# Patient Record
Sex: MALE | Race: WHITE | NOT HISPANIC OR LATINO
[De-identification: names, ages, dates, MRNs, and addresses within clinical notes are randomized per-mention and may not be internally consistent; named-entity substitution may affect disease eponyms.]

---

## 2017-06-08 ENCOUNTER — APPOINTMENT (OUTPATIENT)
Dept: CARDIOLOGY | Facility: CLINIC | Age: 72
End: 2017-06-08

## 2017-11-15 ENCOUNTER — APPOINTMENT (OUTPATIENT)
Dept: CARDIOLOGY | Facility: CLINIC | Age: 72
End: 2017-11-15

## 2017-12-14 ENCOUNTER — APPOINTMENT (OUTPATIENT)
Dept: CARDIOLOGY | Facility: CLINIC | Age: 72
End: 2017-12-14

## 2018-01-03 ENCOUNTER — RECORD ABSTRACTING (OUTPATIENT)
Age: 73
End: 2018-01-03

## 2018-01-03 RX ORDER — TAMSULOSIN HYDROCHLORIDE 0.4 MG/1
0.4 CAPSULE ORAL DAILY
Refills: 0 | Status: ACTIVE | COMMUNITY

## 2018-01-08 ENCOUNTER — RX RENEWAL (OUTPATIENT)
Age: 73
End: 2018-01-08

## 2018-10-29 ENCOUNTER — RX RENEWAL (OUTPATIENT)
Age: 73
End: 2018-10-29

## 2019-01-14 ENCOUNTER — RX RENEWAL (OUTPATIENT)
Age: 74
End: 2019-01-14

## 2019-01-15 ENCOUNTER — RX RENEWAL (OUTPATIENT)
Age: 74
End: 2019-01-15

## 2019-01-31 ENCOUNTER — APPOINTMENT (OUTPATIENT)
Dept: CARDIOLOGY | Facility: CLINIC | Age: 74
End: 2019-01-31
Payer: MEDICARE

## 2019-01-31 ENCOUNTER — NON-APPOINTMENT (OUTPATIENT)
Age: 74
End: 2019-01-31

## 2019-01-31 VITALS
OXYGEN SATURATION: 95 % | HEIGHT: 74 IN | DIASTOLIC BLOOD PRESSURE: 70 MMHG | WEIGHT: 265 LBS | HEART RATE: 65 BPM | BODY MASS INDEX: 34.01 KG/M2 | SYSTOLIC BLOOD PRESSURE: 132 MMHG

## 2019-01-31 DIAGNOSIS — M47.9 SPONDYLOSIS, UNSPECIFIED: ICD-10-CM

## 2019-01-31 DIAGNOSIS — Z87.891 PERSONAL HISTORY OF NICOTINE DEPENDENCE: ICD-10-CM

## 2019-01-31 PROCEDURE — 93000 ELECTROCARDIOGRAM COMPLETE: CPT

## 2019-01-31 RX ORDER — THIAMINE HCL 100 MG
500 TABLET ORAL DAILY
Refills: 0 | Status: DISCONTINUED | COMMUNITY
End: 2019-01-31

## 2019-01-31 RX ORDER — CHLORDIAZEPOXIDE HYDROCHLORIDE 25 MG/1
25 CAPSULE ORAL 4 TIMES DAILY
Refills: 0 | Status: ACTIVE | COMMUNITY

## 2019-01-31 RX ORDER — GABAPENTIN 300 MG/1
300 CAPSULE ORAL TWICE DAILY
Refills: 0 | Status: DISCONTINUED | COMMUNITY
End: 2019-01-31

## 2019-01-31 NOTE — REVIEW OF SYSTEMS
[Dyspnea on exertion] : dyspnea during exertion [Joint Pain] : joint pain [Negative] : Heme/Lymph [Chest  Pressure] : no chest pressure [Chest Pain] : no chest pain [Lower Ext Edema] : no extremity edema [Leg Claudication] : no intermittent leg claudication [Palpitations] : no palpitations [FreeTextEntry1] : Chronic back pain

## 2019-01-31 NOTE — REASON FOR VISIT
[FreeTextEntry1] : Matt is a 73-year-old male with a history of hypertension, asthma, chronic back pain, remote tobacco, and moderate obesity, moderate alcohol, AF on Xarelto. \par \par Patient has dyspnea with moderate exertion. Cardiovascular review of symptoms is negative for exertional chest pain,  palpitations, dizziness or syncope.  No PND or orthopnea leg edema.  No bleeding or black stool.\par \par Patient has chronic back pain, pain management with epidural injections with Dr. Mims.  Patient is unable to complete treadmill stress test and will be scheduled for pharmacologic stress test. \par \par Patient continues to drink 2 vodkas per day. Advised patient to stop all forms of alcohol. \par \par Patient had a prior admission to [facility] on July 25, 2014, for Syncope found to be in rapid atrial fibrillation. Patient states he had several drinks the night prior, the next morning after drinking coffee had a syncopal event. He was found to be in rapid atrial fibrillation in the emergency room, ventricular rate of 140 and with rate control converted to sinus rhythm. The NYA-VASC score 2,  on Xarelto  20mg daily. \par \par EKG January 2019, sinus rhythm first degree AV block PRWP, old anteroseptal MI\par \par Exercise Myoview stress test December 2016, LVEF 59%, normal perfusion nonischemic EKG response, symptoms, 89% MPHR, 5 minute Roberto protocol.\par \par 2-D echo November 2016, LVEF 65%, LA size is 4.6, mild diastolic dysfunction, mild MR, mild/moderate PI, PASP 33\par \par EKG Nov 2015, SB VR47 PRWP LAD\par \par EKG October 2014 SB PRWP LAD\par \par CardioNet event recorder completed for 4 weeks Sept-Oct 2014, NSR no AFib PVC's NSVT. \par \par 2DEcho [facility] July, 2014, reveals LVEF of 60% to 65%, mild diastolic dysfunction, mild MR, moderate TR, PA pressure of 43, moderate left atrial enlargement at 5.1, mild left ventricular hypertrophy, and normal LV chamber size.\par \par

## 2019-01-31 NOTE — PHYSICAL EXAM
[General Appearance - Well Developed] : well developed [Normal Appearance] : normal appearance [Well Groomed] : well groomed [General Appearance - Well Nourished] : well nourished [No Deformities] : no deformities [General Appearance - In No Acute Distress] : no acute distress [Normal Conjunctiva] : the conjunctiva exhibited no abnormalities [Eyelids - No Xanthelasma] : the eyelids demonstrated no xanthelasmas [Normal Oral Mucosa] : normal oral mucosa [No Oral Pallor] : no oral pallor [No Oral Cyanosis] : no oral cyanosis [Normal Jugular Venous A Waves Present] : normal jugular venous A waves present [Normal Jugular Venous V Waves Present] : normal jugular venous V waves present [No Jugular Venous Howard A Waves] : no jugular venous howard A waves [Respiration, Rhythm And Depth] : normal respiratory rhythm and effort [Exaggerated Use Of Accessory Muscles For Inspiration] : no accessory muscle use [Auscultation Breath Sounds / Voice Sounds] : lungs were clear to auscultation bilaterally [Heart Rate And Rhythm] : heart rate and rhythm were normal [Heart Sounds] : normal S1 and S2 [Murmurs] : no murmurs present [Abdomen Soft] : soft [Abdomen Tenderness] : non-tender [Abdomen Mass (___ Cm)] : no abdominal mass palpated [Abnormal Walk] : normal gait [Gait - Sufficient For Exercise Testing] : the gait was sufficient for exercise testing [Nail Clubbing] : no clubbing of the fingernails [Cyanosis, Localized] : no localized cyanosis [Petechial Hemorrhages (___cm)] : no petechial hemorrhages [Skin Color & Pigmentation] : normal skin color and pigmentation [] : no rash [No Venous Stasis] : no venous stasis [Skin Lesions] : no skin lesions [No Skin Ulcers] : no skin ulcer [No Xanthoma] : no  xanthoma was observed [Oriented To Time, Place, And Person] : oriented to person, place, and time [Affect] : the affect was normal [Mood] : the mood was normal [No Anxiety] : not feeling anxious [FreeTextEntry1] : Pleasant obese elderly male

## 2019-01-31 NOTE — DISCUSSION/SUMMARY
[FreeTextEntry1] : Matt is a 73-year-old male  with medical history detailed above and active medical issues including:\par \par -Dyspnea moderate exertion anginal equivalent, abnormal baseline EKG, multiple CAD risk factors. Patient will have noninvasive testing with [employer] Myoview stress echo to assess for obstructive CAD, HR and BP response, exercise-induced arrhythmia, 2DEcho for LVEF, structural heart disease. \par \par - PAF in the setting of dehydration alcohol and caffeine, CHADS-VASc score 2 on Xarelto\par \par - HTN at goal <130/90 on atenolol and lostartan\par \par - Asthma\par \par - Obesity. Discussed calorie reduction and increased exercise as a weight reduction strategy.\par \par - Brief NSVT in setting of normal LVEF\par \par - History of hemorrhoidal surgery stable\par \par Continued risk factor modification as been discussed, low sodium low fat diet, moderate physical activity.\par \par Patient will be seen in cardiology follow after noninvasive testing. Current cardiac medications remain unchanged. Repeat labs will be ordered with PMD. Recommended increased oral hydration with electrolyte suppliment drinks, avoid caffeine and alcohol intake.\par \par Matt will follow-up with Dr. Turner for primary care.\par \par  \par \par

## 2019-02-22 ENCOUNTER — RECORD ABSTRACTING (OUTPATIENT)
Age: 74
End: 2019-02-22

## 2019-02-26 ENCOUNTER — APPOINTMENT (OUTPATIENT)
Dept: CARDIOLOGY | Facility: CLINIC | Age: 74
End: 2019-02-26
Payer: MEDICARE

## 2019-02-26 PROCEDURE — A9502: CPT

## 2019-02-26 PROCEDURE — 93015 CV STRESS TEST SUPVJ I&R: CPT

## 2019-02-26 PROCEDURE — 78452 HT MUSCLE IMAGE SPECT MULT: CPT

## 2019-02-26 PROCEDURE — 93306 TTE W/DOPPLER COMPLETE: CPT

## 2019-02-26 RX ORDER — EMPAGLIFLOZIN 10 MG/1
10 TABLET, FILM COATED ORAL DAILY
Refills: 0 | Status: DISCONTINUED | COMMUNITY
End: 2019-02-26

## 2019-02-28 ENCOUNTER — APPOINTMENT (OUTPATIENT)
Age: 74
End: 2019-02-28
Payer: MEDICARE

## 2019-02-28 VITALS
SYSTOLIC BLOOD PRESSURE: 130 MMHG | HEART RATE: 56 BPM | DIASTOLIC BLOOD PRESSURE: 72 MMHG | OXYGEN SATURATION: 96 % | BODY MASS INDEX: 34.14 KG/M2 | HEIGHT: 74 IN | WEIGHT: 266 LBS

## 2019-02-28 DIAGNOSIS — Z87.898 PERSONAL HISTORY OF OTHER SPECIFIED CONDITIONS: ICD-10-CM

## 2019-02-28 DIAGNOSIS — Z86.79 PERSONAL HISTORY OF OTHER DISEASES OF THE CIRCULATORY SYSTEM: ICD-10-CM

## 2019-02-28 DIAGNOSIS — Z86.39 PERSONAL HISTORY OF OTHER ENDOCRINE, NUTRITIONAL AND METABOLIC DISEASE: ICD-10-CM

## 2019-02-28 DIAGNOSIS — Z80.42 FAMILY HISTORY OF MALIGNANT NEOPLASM OF PROSTATE: ICD-10-CM

## 2019-02-28 PROCEDURE — 99214 OFFICE O/P EST MOD 30 MIN: CPT

## 2019-02-28 NOTE — REASON FOR VISIT
[Follow-Up - Clinic] : a clinic follow-up of [FreeTextEntry2] : Noninvasive testing for dyspnea on exertion, multiple CAD risk factors, HTN, dyslipidemia, obesity, type 2 diabetes [FreeTextEntry1] : Matt is a 73-year-old male with a history of hypertension, asthma, chronic back pain, remote tobacco, and moderate obesity, moderate alcohol, AF on Xarelto. \par \par Patient has dyspnea with moderate exertion. Cardiovascular review of symptoms is negative for exertional chest pain,  palpitations, dizziness or syncope.  No PND or orthopnea leg edema.  No bleeding or black stool.\par \par Patient has chronic back pain, pain management with epidural injections with Dr. Mims.  Patient is unable to complete treadmill stress test and will be scheduled for pharmacologic stress test. \par \par Patient continues to drink 2 vodkas per day. Advised patient to stop all forms of alcohol. \par \par Patient had a prior admission to [facility] on July 25, 2014, for Syncope found to be in rapid atrial fibrillation. Patient states he had several drinks the night prior, the next morning after drinking coffee had a syncopal event. He was found to be in rapid atrial fibrillation in the emergency room, ventricular rate of 140 and with rate control converted to sinus rhythm. The NYA-VASC score 2,  on Xarelto  20mg daily. \par \par Lexascan Myoview stress test February 2019, LVEF 60%, small fixed apical defect with normal wall motion likely attenuation artifact, no ischemia, no chest pain, baseline sinus bradycardia PRWP, old ASWMI, NSST\par \par Echocardiogram February 2018, LVEF 60%, mild diastolic dysfunction, mild MR and TR, normal RVSP\par \par EKG January 2019, sinus rhythm first degree AV block PRWP, old anteroseptal MI\par \par Exercise Myoview stress test December 2016, LVEF 59%, normal perfusion nonischemic EKG response, symptoms, 89% MPHR, 5 minute Roberto protocol.\par \par 2-D echo November 2016, LVEF 65%, LA size is 4.6, mild diastolic dysfunction, mild MR, mild/moderate PI, PASP 33\par \par EKG Nov 2015, SB VR47 PRWP LAD\par \par EKG October 2014 SB PRWP LAD\par \par CardioNet event recorder completed for 4 weeks Sept-Oct 2014, NSR no AFib PVC's NSVT. \par \par 2DEcho [facility] July, 2014, reveals LVEF of 60% to 65%, mild diastolic dysfunction, mild MR, moderate TR, PA pressure of 43, moderate left atrial enlargement at 5.1, mild left ventricular hypertrophy, and normal LV chamber size.\par \par

## 2019-02-28 NOTE — DISCUSSION/SUMMARY
[FreeTextEntry1] : Matt is a 73-year-old male  with medical history detailed above and active medical issues including:\par \par -Dyspnea moderate exertion , normal perfusion Myoview stress test with normal LVEF Feb 2019.\par \par - PAF in the setting of dehydration alcohol and caffeine, CHADS-VASc score 2 on Xarelto\par \par - HTN at goal <130/90 on atenolol and lostartan\par \par - Asthma\par \par - Obesity. Discussed calorie reduction and increased exercise as a weight reduction strategy.\par \par - Brief NSVT in setting of normal LVEF\par \par - History of hemorrhoidal surgery stable\par \par Continued risk factor modification as been discussed, low sodium low fat diet, moderate physical activity.\par \par Patient will be seen in cardiology follow 6 months. Carotid and abdominal ultrasound to assess for obstructive PAD. \par  \par Current cardiac medications remain unchanged. Repeat labs will be ordered with PMD. Recommended increased oral hydration with electrolyte suppliment drinks, avoid caffeine and alcohol intake.\par \par Matt will follow-up with Dr. Turner for primary care.\par \par  \par \par

## 2019-05-15 ENCOUNTER — RX RENEWAL (OUTPATIENT)
Age: 74
End: 2019-05-15

## 2019-07-29 ENCOUNTER — RX RENEWAL (OUTPATIENT)
Age: 74
End: 2019-07-29

## 2019-07-29 ENCOUNTER — MEDICATION RENEWAL (OUTPATIENT)
Age: 74
End: 2019-07-29

## 2019-09-09 ENCOUNTER — APPOINTMENT (OUTPATIENT)
Dept: CARDIOLOGY | Facility: CLINIC | Age: 74
End: 2019-09-09

## 2019-09-18 ENCOUNTER — APPOINTMENT (OUTPATIENT)
Dept: CARDIOLOGY | Facility: CLINIC | Age: 74
End: 2019-09-18
Payer: MEDICARE

## 2019-09-18 VITALS
HEIGHT: 74 IN | WEIGHT: 260 LBS | SYSTOLIC BLOOD PRESSURE: 140 MMHG | BODY MASS INDEX: 33.37 KG/M2 | DIASTOLIC BLOOD PRESSURE: 72 MMHG | HEART RATE: 58 BPM | OXYGEN SATURATION: 94 %

## 2019-09-18 PROCEDURE — 99214 OFFICE O/P EST MOD 30 MIN: CPT

## 2019-09-18 NOTE — REASON FOR VISIT
[Follow-Up - Clinic] : a clinic follow-up of [FreeTextEntry2] : dyspnea on exertion, multiple CAD risk factors, HTN, dyslipidemia, obesity, type 2 diabetes [FreeTextEntry1] : Matt is a 74-year-old male with a history of hypertension, asthma, chronic back pain, remote tobacco, and moderate obesity, moderate alcohol, AF on Xarelto. \par \par Patient has dyspnea with moderate exertion. Cardiovascular review of symptoms is negative for exertional chest pain,  palpitations, dizziness or syncope.  No PND or orthopnea leg edema.  No bleeding or black stool.\par \par Patient has chronic back pain, pain management with epidural injections with Dr. Mims.  Patient is unable to complete treadmill stress test and will be scheduled for pharmacologic stress test. \par \par Patient continues to drink 2 vodkas per day. Advised patient to stop all forms of alcohol. \par \par Patient had a prior admission to [facility] on July 25, 2014, for Syncope found to be in rapid atrial fibrillation. Patient states he had several drinks the night prior, the next morning after drinking coffee had a syncopal event. He was found to be in rapid atrial fibrillation in the emergency room, ventricular rate of 140 and with rate control converted to sinus rhythm. The NYA-VASC score 2,  on Xarelto  20mg daily. 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 \par \par 2DEcho [facility] July, 2014, reveals LVEF of 60% to 65%, mild diastolic dysfunction, mild MR, moderate TR, PA pressure of 43, moderate left atrial enlargement at 5.1, mild left ventricular hypertrophy, and normal LV chamber size.\par \par

## 2019-09-18 NOTE — REVIEW OF SYSTEMS
[Dyspnea on exertion] : dyspnea during exertion [Joint Pain] : joint pain [Negative] : Heme/Lymph [Chest  Pressure] : no chest pressure [Lower Ext Edema] : no extremity edema [Chest Pain] : no chest pain [Leg Claudication] : no intermittent leg claudication [FreeTextEntry1] : Chronic back pain [Palpitations] : no palpitations

## 2019-09-18 NOTE — PHYSICAL EXAM
[Normal Appearance] : normal appearance [General Appearance - Well Developed] : well developed [General Appearance - Well Nourished] : well nourished [Well Groomed] : well groomed [No Deformities] : no deformities [General Appearance - In No Acute Distress] : no acute distress [Normal Conjunctiva] : the conjunctiva exhibited no abnormalities [Eyelids - No Xanthelasma] : the eyelids demonstrated no xanthelasmas [Normal Oral Mucosa] : normal oral mucosa [No Oral Pallor] : no oral pallor [No Oral Cyanosis] : no oral cyanosis [Normal Jugular Venous A Waves Present] : normal jugular venous A waves present [Normal Jugular Venous V Waves Present] : normal jugular venous V waves present [No Jugular Venous Howard A Waves] : no jugular venous howard A waves [Respiration, Rhythm And Depth] : normal respiratory rhythm and effort [Auscultation Breath Sounds / Voice Sounds] : lungs were clear to auscultation bilaterally [Exaggerated Use Of Accessory Muscles For Inspiration] : no accessory muscle use [Heart Sounds] : normal S1 and S2 [Heart Rate And Rhythm] : heart rate and rhythm were normal [Murmurs] : no murmurs present [Abdomen Soft] : soft [Abdomen Tenderness] : non-tender [Abdomen Mass (___ Cm)] : no abdominal mass palpated [Gait - Sufficient For Exercise Testing] : the gait was sufficient for exercise testing [Abnormal Walk] : normal gait [Cyanosis, Localized] : no localized cyanosis [Nail Clubbing] : no clubbing of the fingernails [Petechial Hemorrhages (___cm)] : no petechial hemorrhages [] : no rash [Skin Color & Pigmentation] : normal skin color and pigmentation [No Venous Stasis] : no venous stasis [Skin Lesions] : no skin lesions [No Xanthoma] : no  xanthoma was observed [No Skin Ulcers] : no skin ulcer [Affect] : the affect was normal [Oriented To Time, Place, And Person] : oriented to person, place, and time [Mood] : the mood was normal [No Anxiety] : not feeling anxious [FreeTextEntry1] : Pleasant obese elderly male

## 2019-09-18 NOTE — DISCUSSION/SUMMARY
[FreeTextEntry1] : Matt is a 74-year-old male  with medical history detailed above and active medical issues including:\par \par -Chronic dyspnea with moderate exertion , normal perfusion Myoview stress test with normal LVEF Feb 2019.\par \par - PAF in the setting of dehydration alcohol and caffeine, CHADS-VASc score 2 on Xarelto\par \par - HTN at goal <130/90 on atenolol and lostartan\par \par - Asthma\par \par - Obesity. Discussed calorie reduction and increased exercise as a weight reduction strategy.\par \par - Brief NSVT in setting of normal LVEF\par \par - History of hemorrhoidal surgery stable\par \par Continued risk factor modification as been discussed, low sodium low fat diet, moderate physical activity.\par \par Patient will be seen in cardiology follow 6 months. Carotid and abdominal ultrasound to assess for obstructive PAD. \par  \par Current cardiac medications remain unchanged. Repeat labs will be ordered with PMD. Recommended increased oral hydration with electrolyte suppliment drinks, avoid caffeine and alcohol intake.\par \par Matt will follow-up with Dr. Turner for primary care.\par \par  \par \par

## 2020-03-18 ENCOUNTER — APPOINTMENT (OUTPATIENT)
Dept: CARDIOLOGY | Facility: CLINIC | Age: 75
End: 2020-03-18

## 2020-03-25 ENCOUNTER — APPOINTMENT (OUTPATIENT)
Dept: CARDIOLOGY | Facility: CLINIC | Age: 75
End: 2020-03-25

## 2020-05-07 ENCOUNTER — APPOINTMENT (OUTPATIENT)
Dept: CARDIOLOGY | Facility: CLINIC | Age: 75
End: 2020-05-07

## 2020-08-25 ENCOUNTER — APPOINTMENT (OUTPATIENT)
Dept: CARDIOLOGY | Facility: CLINIC | Age: 75
End: 2020-08-25

## 2020-08-31 ENCOUNTER — RX RENEWAL (OUTPATIENT)
Age: 75
End: 2020-08-31

## 2020-11-03 ENCOUNTER — RX RENEWAL (OUTPATIENT)
Age: 75
End: 2020-11-03

## 2020-12-15 ENCOUNTER — RX RENEWAL (OUTPATIENT)
Age: 75
End: 2020-12-15

## 2021-01-14 ENCOUNTER — NON-APPOINTMENT (OUTPATIENT)
Age: 76
End: 2021-01-14

## 2021-01-14 ENCOUNTER — APPOINTMENT (OUTPATIENT)
Dept: CARDIOLOGY | Facility: CLINIC | Age: 76
End: 2021-01-14
Payer: MEDICARE

## 2021-01-14 VITALS
TEMPERATURE: 98.4 F | HEART RATE: 58 BPM | SYSTOLIC BLOOD PRESSURE: 124 MMHG | OXYGEN SATURATION: 96 % | BODY MASS INDEX: 34.78 KG/M2 | WEIGHT: 271 LBS | DIASTOLIC BLOOD PRESSURE: 68 MMHG | HEIGHT: 74 IN

## 2021-01-14 DIAGNOSIS — Z86.79 PERSONAL HISTORY OF OTHER DISEASES OF THE CIRCULATORY SYSTEM: ICD-10-CM

## 2021-01-14 PROCEDURE — 99214 OFFICE O/P EST MOD 30 MIN: CPT

## 2021-01-14 PROCEDURE — 93000 ELECTROCARDIOGRAM COMPLETE: CPT

## 2021-01-14 NOTE — DISCUSSION/SUMMARY
[FreeTextEntry1] : Matt is a 75-year-old male  with medical history detailed above and active medical issues including:\par \par -Chronic dyspnea with moderate exertion , normal perfusion Myoview stress test with normal LVEF Feb 2019.\par \par - PAF in the setting of dehydration alcohol and caffeine, CHADS-VASc score 2 on Xarelto\par \par - HTN at goal <130/90 on atenolol and lostartan\par \par - Asthma\par \par - Obesity. Discussed calorie reduction and increased exercise as a weight reduction strategy.\par \par - Brief NSVT in setting of normal LVEF\par \par - History of hemorrhoidal surgery stable\par \par Continued risk factor modification as been discussed, low sodium low fat diet, moderate physical activity.\par \par Patient will have 2-D echocardiogram to assess LV systolic function, structural heart disease.  Carotid and abdominal ultrasound to assess for obstructive PAD with TEB followup. Patient will be seen in cardiology follow 6 months.  \par  \par Current cardiac medications remain unchanged. Repeat labs will be ordered with PMD. Recommended increased oral hydration with electrolyte suppliment drinks, avoid caffeine and alcohol intake.\par \par Matt will follow-up with Dr. Turner for primary care.\par \par \par  \par \par

## 2021-01-14 NOTE — REASON FOR VISIT
[Follow-Up - Clinic] : a clinic follow-up of [FreeTextEntry2] : dyspnea on exertion, multiple CAD risk factors, HTN, dyslipidemia, obesity, type 2 diabetes [FreeTextEntry1] : Matt is a 75-year-old male with a history of hypertension, asthma, chronic back pain, remote tobacco, and moderate obesity, moderate alcohol, AF on Xarelto. \par \par Patient has dyspnea with moderate exertion unchanged. Cardiovascular review of symptoms is negative for exertional chest pain,  palpitations, dizziness or syncope.  No PND or orthopnea leg edema.  No bleeding or black stool.\par \par Patient has chronic back pain, pain management with epidural injections with Dr. Mims.  Patient is unable to complete treadmill stress test and will be scheduled for pharmacologic stress test. \par \par Patient continues to drink 2 vodkas per day. Advised patient to stop all forms of alcohol. \par \par Patient had a prior admission to [facility] on July 25, 2014, for Syncope found to be in rapid atrial fibrillation. Patient states he had several drinks the night prior, the next morning after drinking coffee had a syncopal event. He was found to be in rapid atrial fibrillation in the emergency room, ventricular rate of 140 and with rate control converted to sinus rhythm. The NYA-VASC score 2,  on Xarelto  20mg daily. 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 \par \par 2DEcho [facility] July, 2014, reveals LVEF of 60% to 65%, mild diastolic dysfunction, mild MR, moderate TR, PA pressure of 43, moderate left atrial enlargement at 5.1, mild left ventricular hypertrophy, and normal LV chamber size.\par \par

## 2021-01-29 ENCOUNTER — APPOINTMENT (OUTPATIENT)
Dept: CARDIOLOGY | Facility: CLINIC | Age: 76
End: 2021-01-29
Payer: MEDICARE

## 2021-01-29 PROCEDURE — 93880 EXTRACRANIAL BILAT STUDY: CPT

## 2021-01-29 PROCEDURE — 93979 VASCULAR STUDY: CPT

## 2021-01-29 PROCEDURE — 93306 TTE W/DOPPLER COMPLETE: CPT

## 2021-02-04 ENCOUNTER — APPOINTMENT (OUTPATIENT)
Dept: CARDIOLOGY | Facility: CLINIC | Age: 76
End: 2021-02-04
Payer: MEDICARE

## 2021-02-04 PROCEDURE — 99443: CPT | Mod: 95

## 2021-07-08 ENCOUNTER — APPOINTMENT (OUTPATIENT)
Dept: CARDIOLOGY | Facility: CLINIC | Age: 76
End: 2021-07-08
Payer: MEDICARE

## 2021-07-08 VITALS
BODY MASS INDEX: 33.75 KG/M2 | HEIGHT: 74 IN | SYSTOLIC BLOOD PRESSURE: 132 MMHG | WEIGHT: 263 LBS | TEMPERATURE: 98.2 F | HEART RATE: 85 BPM | DIASTOLIC BLOOD PRESSURE: 78 MMHG | OXYGEN SATURATION: 94 %

## 2021-07-08 PROCEDURE — 99214 OFFICE O/P EST MOD 30 MIN: CPT

## 2021-07-08 NOTE — REASON FOR VISIT
[Follow-Up - Clinic] : a clinic follow-up of [Other: ____] : [unfilled] [FreeTextEntry1] : Matt is a 76-year-old male with a history of hypertension, asthma, chronic back pain, remote tobacco, and moderate obesity, moderate alcohol, AF on Xarelto. \par \par Patient has dyspnea with moderate exertion possible anginal equivalent. Cardiovascular review of symptoms is negative for exertional chest pain,  palpitations, dizziness or syncope.  No PND or orthopnea leg edema.  No bleeding or black stool.\par \par Patient has chronic back pain, pain management with epidural injections with Dr. Mims.  Patient is unable to complete treadmill stress test and will be scheduled for pharmacologic stress test. \par \par Patient continues to drink 2 vodkas per day. Advised patient to stop all forms of alcohol. \par \par Patient had a prior admission to [facility] on July 25, 2014, for Syncope found to be in rapid atrial fibrillation. Patient states he had several drinks the night prior, the next morning after drinking coffee had a syncopal event. He was found to be in rapid atrial fibrillation in the emergency room, ventricular rate of 140 and with rate control converted to sinus rhythm. The NYA-VASC score 2,  on Xarelto  20mg daily. 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 \par \par 2DEcho [facility] July, 2014, reveals LVEF of 60% to 65%, mild diastolic dysfunction, mild MR, moderate TR, PA pressure of 43, moderate left atrial enlargement at 5.1, mild left ventricular hypertrophy, and normal LV chamber size.\par \par  [FreeTextEntry2] : dyspnea on exertion, multiple CAD risk factors, HTN, dyslipidemia, obesity, type 2 diabetes

## 2021-07-08 NOTE — DISCUSSION/SUMMARY
[FreeTextEntry1] : Matt is a 76-year-old male  with medical history detailed above and active medical issues including:\par \par -Dyspnea on exertion concerning for angina, multiple CAD risk factors.  Patient will have noninvasive testing with a Lexiscan Myoview stress test to assess for obstructive CAD. \par \par - PAF in the setting of dehydration alcohol and caffeine, CHADS-VASc score 2 on Xarelto\par \par - HTN at goal <130/90 on atenolol and lostartan\par \par - Asthma\par \par - Obesity. Discussed calorie reduction and increased exercise as a weight reduction strategy.\par \par - Brief NSVT in setting of normal LVEF\par \par - History of hemorrhoidal surgery stable\par \par Continued risk factor modification as been discussed, low sodium low fat diet, moderate physical activity.\par \par Patient will be seen in cardiology follow-up after noninvasive testing..  \par  \par Current cardiac medications remain unchanged. Repeat labs will be ordered with PMD. Recommended increased oral hydration with electrolyte suppliment drinks, avoid caffeine and alcohol intake.\par \par Matt will follow-up with Dr. Turner for primary care.\par \par \par  \par \par

## 2021-07-08 NOTE — PHYSICAL EXAM
[General Appearance - Well Developed] : well developed [Normal Appearance] : normal appearance [Well Groomed] : well groomed [General Appearance - Well Nourished] : well nourished [No Deformities] : no deformities [General Appearance - In No Acute Distress] : no acute distress [Eyelids - No Xanthelasma] : the eyelids demonstrated no xanthelasmas [Normal Oral Mucosa] : normal oral mucosa [No Oral Pallor] : no oral pallor [No Oral Cyanosis] : no oral cyanosis [Normal Jugular Venous A Waves Present] : normal jugular venous A waves present [Normal Jugular Venous V Waves Present] : normal jugular venous V waves present [No Jugular Venous Howard A Waves] : no jugular venous howard A waves [Respiration, Rhythm And Depth] : normal respiratory rhythm and effort [Exaggerated Use Of Accessory Muscles For Inspiration] : no accessory muscle use [Auscultation Breath Sounds / Voice Sounds] : lungs were clear to auscultation bilaterally [Heart Rate And Rhythm] : heart rate and rhythm were normal [Heart Sounds] : normal S1 and S2 [Murmurs] : no murmurs present [Abdomen Soft] : soft [Abdomen Tenderness] : non-tender [Abdomen Mass (___ Cm)] : no abdominal mass palpated [Abnormal Walk] : normal gait [Gait - Sufficient For Exercise Testing] : the gait was sufficient for exercise testing [Nail Clubbing] : no clubbing of the fingernails [Cyanosis, Localized] : no localized cyanosis [Petechial Hemorrhages (___cm)] : no petechial hemorrhages [Skin Color & Pigmentation] : normal skin color and pigmentation [] : no rash [No Venous Stasis] : no venous stasis [Skin Lesions] : no skin lesions [No Skin Ulcers] : no skin ulcer [No Xanthoma] : no  xanthoma was observed [Oriented To Time, Place, And Person] : oriented to person, place, and time [Affect] : the affect was normal [Mood] : the mood was normal [No Anxiety] : not feeling anxious [Well Developed] : well developed [Well Nourished] : well nourished [No Acute Distress] : no acute distress [Normal Conjunctiva] : normal conjunctiva [Normal Venous Pressure] : normal venous pressure [No Carotid Bruit] : no carotid bruit [Normal S1, S2] : normal S1, S2 [No Murmur] : no murmur [No Rub] : no rub [No Gallop] : no gallop [Clear Lung Fields] : clear lung fields [Good Air Entry] : good air entry [No Respiratory Distress] : no respiratory distress  [Soft] : abdomen soft [Non Tender] : non-tender [No Masses/organomegaly] : no masses/organomegaly [Normal Bowel Sounds] : normal bowel sounds [No Edema] : no edema [No Cyanosis] : no cyanosis [No Clubbing] : no clubbing [No Varicosities] : no varicosities [No Rash] : no rash [No Skin Lesions] : no skin lesions [Moves all extremities] : moves all extremities [No Focal Deficits] : no focal deficits [Normal Speech] : normal speech [Alert and Oriented] : alert and oriented [Normal memory] : normal memory [de-identified] : Limited ambulation walking less than 5 minutes [FreeTextEntry1] : Pleasant obese elderly male

## 2021-10-12 ENCOUNTER — APPOINTMENT (OUTPATIENT)
Dept: CARDIOLOGY | Facility: CLINIC | Age: 76
End: 2021-10-12
Payer: MEDICARE

## 2021-10-12 PROCEDURE — 93015 CV STRESS TEST SUPVJ I&R: CPT

## 2021-10-12 PROCEDURE — A9502: CPT

## 2021-10-12 PROCEDURE — 78452 HT MUSCLE IMAGE SPECT MULT: CPT

## 2021-11-02 ENCOUNTER — APPOINTMENT (OUTPATIENT)
Dept: CARDIOLOGY | Facility: CLINIC | Age: 76
End: 2021-11-02

## 2021-12-15 ENCOUNTER — RX RENEWAL (OUTPATIENT)
Age: 76
End: 2021-12-15

## 2022-04-01 RX ORDER — ATENOLOL 50 MG/1
50 TABLET ORAL TWICE DAILY
Qty: 180 | Refills: 3 | Status: ACTIVE | COMMUNITY
Start: 2018-01-08 | End: 1900-01-01

## 2022-06-16 ENCOUNTER — APPOINTMENT (OUTPATIENT)
Dept: ORTHOPEDIC SURGERY | Facility: CLINIC | Age: 77
End: 2022-06-16

## 2022-06-16 PROCEDURE — 20611 DRAIN/INJ JOINT/BURSA W/US: CPT | Mod: RT

## 2022-06-16 NOTE — PROCEDURE
[Right] : of the right [Knee] : knee [Pain] : pain [Inflammation] : inflammation [X-ray evidence of Osteoarthritis on this or prior visit] : x-ray evidence of Osteoarthritis on this or prior visit [Alcohol] : alcohol [Betadine] : betadine [Ethyl Chloride sprayed topically] : ethyl chloride sprayed topically [Other: ____] : [unfilled] [#1] : series #1 [] : Patient tolerated procedure well [Call if redness, pain or fever occur] : call if redness, pain or fever occur [Apply ice for 15min out of every hour for the next 12-24 hours as tolerated] : apply ice for 15 minutes out of every hour for the next 12-24 hours as tolerated [Patient was advised to rest the joint(s) for ____ days] : patient was advised to rest the joint(s) for [unfilled] days [Previous OTC use and PT nontherapeutic] : patient has tried OTC's including aspirin, Ibuprofen, Aleve, etc or prescription NSAIDS, and/or exercises at home and/or physical therapy without satisfactory response [Patient had decreased mobility in the joint] : patient had decreased mobility in the joint [Risks, benefits, alternatives discussed / Verbal consent obtained] : the risks benefits, and alternatives have been discussed, and verbal consent was obtained [All ultrasound images have been permanently captured and stored accordingly in our picture archiving and communication system] : All ultrasound images have been permanently captured and stored accordingly in our picture archiving and communication system [Visualization of the needle and placement of injection was performed without complication] : visualization of the needle and placement of injection was performed without complication [de-identified] : 1mg

## 2022-06-30 ENCOUNTER — APPOINTMENT (OUTPATIENT)
Dept: ORTHOPEDIC SURGERY | Facility: CLINIC | Age: 77
End: 2022-06-30

## 2022-06-30 PROCEDURE — 20611 DRAIN/INJ JOINT/BURSA W/US: CPT | Mod: RT

## 2022-06-30 NOTE — PROCEDURE
[Right] : of the right [Knee] : knee [Pain] : pain [Inflammation] : inflammation [X-ray evidence of Osteoarthritis on this or prior visit] : x-ray evidence of Osteoarthritis on this or prior visit [Alcohol] : alcohol [Betadine] : betadine [Ethyl Chloride sprayed topically] : ethyl chloride sprayed topically [Other: ____] : [unfilled] [] : Patient tolerated procedure well [Call if redness, pain or fever occur] : call if redness, pain or fever occur [Apply ice for 15min out of every hour for the next 12-24 hours as tolerated] : apply ice for 15 minutes out of every hour for the next 12-24 hours as tolerated [Patient was advised to rest the joint(s) for ____ days] : patient was advised to rest the joint(s) for [unfilled] days [Previous OTC use and PT nontherapeutic] : patient has tried OTC's including aspirin, Ibuprofen, Aleve, etc or prescription NSAIDS, and/or exercises at home and/or physical therapy without satisfactory response [Patient had decreased mobility in the joint] : patient had decreased mobility in the joint [Risks, benefits, alternatives discussed / Verbal consent obtained] : the risks benefits, and alternatives have been discussed, and verbal consent was obtained [All ultrasound images have been permanently captured and stored accordingly in our picture archiving and communication system] : All ultrasound images have been permanently captured and stored accordingly in our picture archiving and communication system [Visualization of the needle and placement of injection was performed without complication] : visualization of the needle and placement of injection was performed without complication [de-identified] : 1mg

## 2022-07-07 ENCOUNTER — APPOINTMENT (OUTPATIENT)
Dept: ORTHOPEDIC SURGERY | Facility: CLINIC | Age: 77
End: 2022-07-07

## 2022-07-07 PROCEDURE — 20611 DRAIN/INJ JOINT/BURSA W/US: CPT | Mod: RT

## 2022-07-11 NOTE — PROCEDURE
[Right] : of the right [Knee] : knee [Pain] : pain [Inflammation] : inflammation [X-ray evidence of Osteoarthritis on this or prior visit] : x-ray evidence of Osteoarthritis on this or prior visit [Alcohol] : alcohol [Betadine] : betadine [Ethyl Chloride sprayed topically] : ethyl chloride sprayed topically [Other: ____] : [unfilled] [] : Patient tolerated procedure well [Call if redness, pain or fever occur] : call if redness, pain or fever occur [Apply ice for 15min out of every hour for the next 12-24 hours as tolerated] : apply ice for 15 minutes out of every hour for the next 12-24 hours as tolerated [Patient was advised to rest the joint(s) for ____ days] : patient was advised to rest the joint(s) for [unfilled] days [Previous OTC use and PT nontherapeutic] : patient has tried OTC's including aspirin, Ibuprofen, Aleve, etc or prescription NSAIDS, and/or exercises at home and/or physical therapy without satisfactory response [Patient had decreased mobility in the joint] : patient had decreased mobility in the joint [Risks, benefits, alternatives discussed / Verbal consent obtained] : the risks benefits, and alternatives have been discussed, and verbal consent was obtained [All ultrasound images have been permanently captured and stored accordingly in our picture archiving and communication system] : All ultrasound images have been permanently captured and stored accordingly in our picture archiving and communication system [Visualization of the needle and placement of injection was performed without complication] : visualization of the needle and placement of injection was performed without complication [de-identified] : 1mg

## 2022-11-14 NOTE — REASON FOR VISIT
"- 2 month history of an "out of body experience" w subsequent development of new onset headaches, workup w cardiology and optometry unremarkable  - MRI brain w and wo contrast to assess for enhancement in temporal lobe or parietal lobe that can explain these findings. Had MRI brain wo contrast done at outside facility however would need a contrasted study  " [FreeTextEntry2] : RT knee injection

## 2023-01-05 NOTE — REASON FOR VISIT
[Other: ____] : [unfilled] [FreeTextEntry1] : Matt is a 76-year-old male with a history of hypertension, asthma, chronic back pain, remote tobacco, and moderate obesity, moderate alcohol, AF on Xarelto. \par \par Patient has dyspnea with moderate exertion possible anginal equivalent. Cardiovascular review of symptoms is negative for exertional chest pain,  palpitations, dizziness or syncope.  No PND or orthopnea leg edema.  No bleeding or black stool.\par \par Patient has chronic back pain, pain management with epidural injections with Dr. Mims.  Patient is unable to complete treadmill stress test and will be scheduled for pharmacologic stress test. \par \par Patient continues to drink 2 vodkas per day. Advised patient to stop all forms of alcohol. \par \par Patient had a prior admission to [facility] on July 25, 2014, for Syncope found to be in rapid atrial fibrillation. Patient states he had several drinks the night prior, the next morning after drinking coffee had a syncopal event. He was found to be in rapid atrial fibrillation in the emergency room, ventricular rate of 140 and with rate control converted to sinus rhythm. The NYA-VASC score 2,  on Xarelto  20mg daily. 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 \par \par 2DEcho [facility] July, 2014, reveals LVEF of 60% to 65%, mild diastolic dysfunction, mild MR, moderate TR, PA pressure of 43, moderate left atrial enlargement at 5.1, mild left ventricular hypertrophy, and normal LV chamber size.\par \par  [Follow-Up - Clinic] : a clinic follow-up of [FreeTextEntry2] : dyspnea on exertion, multiple CAD risk factors, HTN, dyslipidemia, obesity, type 2 diabetes

## 2023-01-05 NOTE — PHYSICAL EXAM
[Well Developed] : well developed [Well Nourished] : well nourished [No Acute Distress] : no acute distress [Normal Venous Pressure] : normal venous pressure [No Carotid Bruit] : no carotid bruit [Normal S1, S2] : normal S1, S2 [No Murmur] : no murmur [No Rub] : no rub [No Gallop] : no gallop [Clear Lung Fields] : clear lung fields [Good Air Entry] : good air entry [No Respiratory Distress] : no respiratory distress  [Soft] : abdomen soft [Non Tender] : non-tender [No Masses/organomegaly] : no masses/organomegaly [Normal Bowel Sounds] : normal bowel sounds [No Edema] : no edema [No Cyanosis] : no cyanosis [No Clubbing] : no clubbing [No Varicosities] : no varicosities [No Rash] : no rash [No Skin Lesions] : no skin lesions [Moves all extremities] : moves all extremities [No Focal Deficits] : no focal deficits [Normal Speech] : normal speech [Alert and Oriented] : alert and oriented [Normal memory] : normal memory [de-identified] : Limited ambulation walking less than 5 minutes [General Appearance - Well Developed] : well developed [Normal Appearance] : normal appearance [Well Groomed] : well groomed [General Appearance - Well Nourished] : well nourished [No Deformities] : no deformities [General Appearance - In No Acute Distress] : no acute distress [FreeTextEntry1] : Pleasant obese elderly male [Normal Conjunctiva] : the conjunctiva exhibited no abnormalities [Eyelids - No Xanthelasma] : the eyelids demonstrated no xanthelasmas [Normal Oral Mucosa] : normal oral mucosa [No Oral Pallor] : no oral pallor [No Oral Cyanosis] : no oral cyanosis [Normal Jugular Venous A Waves Present] : normal jugular venous A waves present [Normal Jugular Venous V Waves Present] : normal jugular venous V waves present [No Jugular Venous Howard A Waves] : no jugular venous howard A waves [Respiration, Rhythm And Depth] : normal respiratory rhythm and effort [Exaggerated Use Of Accessory Muscles For Inspiration] : no accessory muscle use [Auscultation Breath Sounds / Voice Sounds] : lungs were clear to auscultation bilaterally [Heart Rate And Rhythm] : heart rate and rhythm were normal [Heart Sounds] : normal S1 and S2 [Murmurs] : no murmurs present [Abdomen Soft] : soft [Abdomen Tenderness] : non-tender [Abdomen Mass (___ Cm)] : no abdominal mass palpated [Abnormal Walk] : normal gait [Gait - Sufficient For Exercise Testing] : the gait was sufficient for exercise testing [Nail Clubbing] : no clubbing of the fingernails [Cyanosis, Localized] : no localized cyanosis [Petechial Hemorrhages (___cm)] : no petechial hemorrhages [Skin Color & Pigmentation] : normal skin color and pigmentation [] : no rash [No Venous Stasis] : no venous stasis [Skin Lesions] : no skin lesions [No Skin Ulcers] : no skin ulcer [No Xanthoma] : no  xanthoma was observed [Oriented To Time, Place, And Person] : oriented to person, place, and time [Affect] : the affect was normal [Mood] : the mood was normal [No Anxiety] : not feeling anxious

## 2023-01-10 ENCOUNTER — APPOINTMENT (OUTPATIENT)
Dept: CARDIOLOGY | Facility: CLINIC | Age: 78
End: 2023-01-10
Payer: MEDICARE

## 2023-01-17 ENCOUNTER — APPOINTMENT (OUTPATIENT)
Dept: CARDIOLOGY | Facility: CLINIC | Age: 78
End: 2023-01-17
Payer: MEDICARE

## 2023-01-17 ENCOUNTER — NON-APPOINTMENT (OUTPATIENT)
Age: 78
End: 2023-01-17

## 2023-01-17 VITALS
SYSTOLIC BLOOD PRESSURE: 118 MMHG | WEIGHT: 273 LBS | DIASTOLIC BLOOD PRESSURE: 70 MMHG | HEIGHT: 74 IN | HEART RATE: 62 BPM | BODY MASS INDEX: 35.04 KG/M2 | OXYGEN SATURATION: 95 %

## 2023-01-17 PROCEDURE — 99215 OFFICE O/P EST HI 40 MIN: CPT

## 2023-01-17 RX ORDER — TRAMADOL HYDROCHLORIDE AND ACETAMINOPHEN 37.5; 325 MG/1; MG/1
37.5-325 TABLET, FILM COATED ORAL TWICE DAILY
Refills: 0 | Status: DISCONTINUED | COMMUNITY
End: 2023-01-17

## 2023-01-17 RX ORDER — FINASTERIDE 5 MG/1
5 TABLET, FILM COATED ORAL DAILY
Refills: 0 | Status: DISCONTINUED | COMMUNITY
End: 2023-01-17

## 2023-01-17 RX ORDER — ZOLPIDEM TARTRATE 10 MG/1
10 TABLET ORAL
Refills: 0 | Status: DISCONTINUED | COMMUNITY
End: 2023-01-17

## 2023-01-17 RX ORDER — ROSUVASTATIN CALCIUM 5 MG/1
5 TABLET, FILM COATED ORAL DAILY
Refills: 0 | Status: ACTIVE | COMMUNITY

## 2023-01-17 RX ORDER — METFORMIN ER 500 MG 500 MG/1
500 TABLET ORAL DAILY
Refills: 0 | Status: ACTIVE | COMMUNITY

## 2023-01-17 RX ORDER — ERGOCALCIFEROL 1.25 MG/1
1.25 MG CAPSULE, LIQUID FILLED ORAL
Refills: 0 | Status: ACTIVE | COMMUNITY

## 2023-01-17 RX ORDER — HYDROCODONE BITARTRATE AND ACETAMINOPHEN 5; 325 MG/1; MG/1
5-325 TABLET ORAL
Refills: 0 | Status: DISCONTINUED | COMMUNITY
End: 2023-01-17

## 2023-01-17 RX ORDER — METFORMIN HYDROCHLORIDE 500 MG/1
500 TABLET, COATED ORAL TWICE DAILY
Refills: 0 | Status: DISCONTINUED | COMMUNITY
End: 2023-01-17

## 2023-01-17 NOTE — DISCUSSION/SUMMARY
[FreeTextEntry1] : Matt is a 77-year-old male  with medical history detailed above and active medical issues including:\par \par - Dyspnea on exertion, infarct without ischemia seen on Myoview stress test Oct 2021\par \par - PAF in the setting of dehydration alcohol and caffeine, CHADS-VASc score 2 on Xarelto\par \par - HTN at goal <130/90 on atenolol and lostartan\par \par - Asthma\par \par - Obesity. Discussed calorie reduction and increased exercise as a weight reduction strategy.\par \par - Brief NSVT in setting of normal LVEF\par \par - History of hemorrhoidal surgery stable\par \par Continued risk factor modification as been discussed, low sodium low fat diet, moderate physical activity.\par \par Patient will be seen in cardiology follow-up 3 months same-day echocardiogram\par  \par Current cardiac medications remain unchanged. Repeat labs will be ordered with PMD. Recommended increased oral hydration with electrolyte suppliment drinks, avoid caffeine and alcohol intake.\par \par Matt will follow-up with Dr. Turner for primary care.\par \par \par  \par \par

## 2023-01-17 NOTE — PHYSICAL EXAM
[Well Developed] : well developed [Well Nourished] : well nourished [No Acute Distress] : no acute distress [Normal Venous Pressure] : normal venous pressure [No Carotid Bruit] : no carotid bruit [Normal S1, S2] : normal S1, S2 [No Murmur] : no murmur [No Rub] : no rub [No Gallop] : no gallop [Clear Lung Fields] : clear lung fields [Good Air Entry] : good air entry [No Respiratory Distress] : no respiratory distress  [Soft] : abdomen soft [Non Tender] : non-tender [No Masses/organomegaly] : no masses/organomegaly [Normal Bowel Sounds] : normal bowel sounds [No Edema] : no edema [No Cyanosis] : no cyanosis [No Clubbing] : no clubbing [No Varicosities] : no varicosities [No Rash] : no rash [No Skin Lesions] : no skin lesions [Moves all extremities] : moves all extremities [No Focal Deficits] : no focal deficits [Normal Speech] : normal speech [Alert and Oriented] : alert and oriented [Normal memory] : normal memory [General Appearance - Well Developed] : well developed [Normal Appearance] : normal appearance [Well Groomed] : well groomed [General Appearance - Well Nourished] : well nourished [No Deformities] : no deformities [General Appearance - In No Acute Distress] : no acute distress [Normal Conjunctiva] : the conjunctiva exhibited no abnormalities [Eyelids - No Xanthelasma] : the eyelids demonstrated no xanthelasmas [Normal Oral Mucosa] : normal oral mucosa [No Oral Pallor] : no oral pallor [No Oral Cyanosis] : no oral cyanosis [Normal Jugular Venous A Waves Present] : normal jugular venous A waves present [Normal Jugular Venous V Waves Present] : normal jugular venous V waves present [No Jugular Venous Howard A Waves] : no jugular venous howard A waves [Respiration, Rhythm And Depth] : normal respiratory rhythm and effort [Exaggerated Use Of Accessory Muscles For Inspiration] : no accessory muscle use [Auscultation Breath Sounds / Voice Sounds] : lungs were clear to auscultation bilaterally [Heart Rate And Rhythm] : heart rate and rhythm were normal [Heart Sounds] : normal S1 and S2 [Murmurs] : no murmurs present [Abdomen Soft] : soft [Abdomen Tenderness] : non-tender [Abdomen Mass (___ Cm)] : no abdominal mass palpated [Abnormal Walk] : normal gait [Gait - Sufficient For Exercise Testing] : the gait was sufficient for exercise testing [Nail Clubbing] : no clubbing of the fingernails [Cyanosis, Localized] : no localized cyanosis [Petechial Hemorrhages (___cm)] : no petechial hemorrhages [Skin Color & Pigmentation] : normal skin color and pigmentation [] : no rash [No Venous Stasis] : no venous stasis [Skin Lesions] : no skin lesions [No Skin Ulcers] : no skin ulcer [No Xanthoma] : no  xanthoma was observed [Oriented To Time, Place, And Person] : oriented to person, place, and time [Affect] : the affect was normal [Mood] : the mood was normal [No Anxiety] : not feeling anxious [de-identified] : Limited ambulation walking less than 5 minutes [FreeTextEntry1] : Pleasant obese elderly male

## 2023-01-17 NOTE — REASON FOR VISIT
[Other: ____] : [unfilled] [Follow-Up - Clinic] : a clinic follow-up of [FreeTextEntry1] : Matt is a 77-year-old male with a history of hypertension, asthma, chronic back pain, remote tobacco, and moderate obesity, moderate alcohol, AF on Xarelto. \par \par Patient has dyspnea with moderate exertion possible anginal equivalent. Cardiovascular review of symptoms is negative for exertional chest pain,  palpitations, dizziness or syncope.  No PND or orthopnea leg edema.  No bleeding or black stool.\par \par Patient has chronic back pain, pain management with epidural injections with Dr. Mims.  Patient is unable to complete treadmill stress test and will be scheduled for pharmacologic stress test. \par \par Patient continues to drink 2 vodkas per day. Advised patient to stop all forms of alcohol. \par \par Patient had a prior admission to [facility] on July 25, 2014, for Syncope found to be in rapid atrial fibrillation. Patient states he had several drinks the night prior, the next morning after drinking coffee had a syncopal event. He was found to be in rapid atrial fibrillation in the emergency room, ventricular rate of 140 and with rate control converted to sinus rhythm. The NYA-VASC score 2,  on Xarelto  20mg daily. 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 \par \par 2DEcho [facility] July, 2014, reveals LVEF of 60% to 65%, mild diastolic dysfunction, mild MR, moderate TR, PA pressure of 43, moderate left atrial enlargement at 5.1, mild left ventricular hypertrophy, and normal LV chamber size.\par \par  [FreeTextEntry2] : dyspnea on exertion, multiple CAD risk factors, HTN, dyslipidemia, obesity, type 2 diabetes

## 2023-04-10 NOTE — REASON FOR VISIT
<--- Click to Launch ICDx for PreOp, PostOp and Procedure [Other: ____] : [unfilled] [FreeTextEntry1] : Matt is a 77-year-old male with a history of hypertension, asthma, chronic back pain, remote tobacco, and moderate obesity, moderate alcohol, AF on Xarelto. \par \par Patient has dyspnea with moderate exertion possible anginal equivalent. Cardiovascular review of symptoms is negative for exertional chest pain,  palpitations, dizziness or syncope.  No PND or orthopnea leg edema.  No bleeding or black stool.\par \par Patient has chronic back pain, pain management with epidural injections with Dr. Mims.  Patient is unable to complete treadmill stress test and will be scheduled for pharmacologic stress test. \par \par Patient continues to drink 2 vodkas per day. Advised patient to stop all forms of alcohol. \par \par Patient had a prior admission to [facility] on July 25, 2014, for Syncope found to be in rapid atrial fibrillation. Patient states he had several drinks the night prior, the next morning after drinking coffee had a syncopal event. He was found to be in rapid atrial fibrillation in the emergency room, ventricular rate of 140 and with rate control converted to sinus rhythm. The NYA-VASC score 2,  on Xarelto  20mg daily. 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 \par \par 2DEcho [facility] July, 2014, reveals LVEF of 60% to 65%, mild diastolic dysfunction, mild MR, moderate TR, PA pressure of 43, moderate left atrial enlargement at 5.1, mild left ventricular hypertrophy, and normal LV chamber size.\par \par  [Follow-Up - Clinic] : a clinic follow-up of [FreeTextEntry2] : dyspnea on exertion, multiple CAD risk factors, HTN, dyslipidemia, obesity, type 2 diabetes

## 2023-04-10 NOTE — PHYSICAL EXAM
[Well Developed] : well developed [Well Nourished] : well nourished [No Acute Distress] : no acute distress [Normal Venous Pressure] : normal venous pressure [No Carotid Bruit] : no carotid bruit [Normal S1, S2] : normal S1, S2 [No Murmur] : no murmur [No Rub] : no rub [No Gallop] : no gallop [Clear Lung Fields] : clear lung fields [Good Air Entry] : good air entry [No Respiratory Distress] : no respiratory distress  [Soft] : abdomen soft [Non Tender] : non-tender [No Masses/organomegaly] : no masses/organomegaly [Normal Bowel Sounds] : normal bowel sounds [No Edema] : no edema [No Cyanosis] : no cyanosis [No Clubbing] : no clubbing [No Varicosities] : no varicosities [No Rash] : no rash [No Skin Lesions] : no skin lesions [Moves all extremities] : moves all extremities [No Focal Deficits] : no focal deficits [Normal Speech] : normal speech [Alert and Oriented] : alert and oriented [Normal memory] : normal memory [de-identified] : Limited ambulation walking less than 5 minutes [General Appearance - Well Developed] : well developed [Normal Appearance] : normal appearance [Well Groomed] : well groomed [General Appearance - Well Nourished] : well nourished [No Deformities] : no deformities [General Appearance - In No Acute Distress] : no acute distress [FreeTextEntry1] : Pleasant obese elderly male [Normal Conjunctiva] : the conjunctiva exhibited no abnormalities [Eyelids - No Xanthelasma] : the eyelids demonstrated no xanthelasmas [Normal Oral Mucosa] : normal oral mucosa [No Oral Pallor] : no oral pallor [No Oral Cyanosis] : no oral cyanosis [Normal Jugular Venous A Waves Present] : normal jugular venous A waves present [Normal Jugular Venous V Waves Present] : normal jugular venous V waves present [No Jugular Venous Howard A Waves] : no jugular venous howard A waves [Respiration, Rhythm And Depth] : normal respiratory rhythm and effort [Exaggerated Use Of Accessory Muscles For Inspiration] : no accessory muscle use [Auscultation Breath Sounds / Voice Sounds] : lungs were clear to auscultation bilaterally [Heart Rate And Rhythm] : heart rate and rhythm were normal [Heart Sounds] : normal S1 and S2 [Murmurs] : no murmurs present [Abdomen Soft] : soft [Abdomen Tenderness] : non-tender [Abdomen Mass (___ Cm)] : no abdominal mass palpated [Abnormal Walk] : normal gait [Gait - Sufficient For Exercise Testing] : the gait was sufficient for exercise testing [Nail Clubbing] : no clubbing of the fingernails [Cyanosis, Localized] : no localized cyanosis [Petechial Hemorrhages (___cm)] : no petechial hemorrhages [Skin Color & Pigmentation] : normal skin color and pigmentation [] : no rash [No Venous Stasis] : no venous stasis [Skin Lesions] : no skin lesions [No Skin Ulcers] : no skin ulcer [No Xanthoma] : no  xanthoma was observed [Oriented To Time, Place, And Person] : oriented to person, place, and time [Affect] : the affect was normal [Mood] : the mood was normal [No Anxiety] : not feeling anxious

## 2023-04-17 ENCOUNTER — APPOINTMENT (OUTPATIENT)
Dept: CARDIOLOGY | Facility: CLINIC | Age: 78
End: 2023-04-17

## 2023-06-14 ENCOUNTER — NON-APPOINTMENT (OUTPATIENT)
Age: 78
End: 2023-06-14

## 2023-06-14 ENCOUNTER — APPOINTMENT (OUTPATIENT)
Dept: CARDIOLOGY | Facility: CLINIC | Age: 78
End: 2023-06-14
Payer: MEDICARE

## 2023-06-14 VITALS
BODY MASS INDEX: 34.65 KG/M2 | WEIGHT: 270 LBS | HEIGHT: 74 IN | DIASTOLIC BLOOD PRESSURE: 68 MMHG | OXYGEN SATURATION: 94 % | SYSTOLIC BLOOD PRESSURE: 132 MMHG | HEART RATE: 77 BPM

## 2023-06-14 PROCEDURE — 99214 OFFICE O/P EST MOD 30 MIN: CPT

## 2023-06-14 NOTE — DISCUSSION/SUMMARY
[FreeTextEntry1] : Matt is a 78-year-old male  with medical history detailed above and active medical issues including:\par \par - Dyspnea on exertion, infarct without ischemia seen on Myoview stress test Oct 2021\par \par - PAF in the setting of dehydration alcohol and caffeine, CHADS-VASc score 2 on Xarelto\par \par - HTN at goal <130/90 on atenolol and lostartan\par \par - Asthma\par \par - Obesity. Discussed calorie reduction and increased exercise as a weight reduction strategy.\par \par - Brief NSVT in setting of normal LVEF\par \par - History of hemorrhoidal surgery stable\par \par Continued risk factor modification as been discussed, low sodium low fat diet, moderate physical activity.\par \par Patient will be seen in cardiology follow-up 3 months same-day echocardiogram\par  \par Preop evaluation was done.  Overall no significant risk for cardiovascular events during low risk procedure.  Hold anticoagulation for 72 hours prior to procedure and restart after the procedure as soon as possible.\par \par  \par \par

## 2023-06-14 NOTE — REASON FOR VISIT
Pt tolerated popsicle with no n/v. [Other: ____] : [unfilled] [Follow-Up - Clinic] : a clinic follow-up of [FreeTextEntry1] : Matt is a 77-year-old male with a history of hypertension, asthma, chronic back pain, remote tobacco, and moderate obesity, moderate alcohol, AF on Xarelto. \par \par Patient is preop for spinal cord procedure.\par Patient has chronic back pain, pain management with epidural injections with Dr. Mims.  Patient is unable to complete treadmill stress test and will be scheduled for pharmacologic stress test. \par \par Patient continues to drink 2 vodkas per day. Advised patient to stop all forms of alcohol. \par \par Patient had a prior admission to [facility] on July 25, 2014, for Syncope found to be in rapid atrial fibrillation. Patient states he had several drinks the night prior, the next morning after drinking coffee had a syncopal event. He was found to be in rapid atrial fibrillation in the emergency room, ventricular rate of 140 and with rate control converted to sinus rhythm. The NYA-VASC score 2,  on Xarelto  20mg daily. 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 \par \par 2DEcho SHH July, 2014, reveals LVEF of 60% to 65%, mild diastolic dysfunction, mild MR, moderate TR, PA pressure of 43, moderate left atrial enlargement at 5.1, mild left ventricular hypertrophy, and normal LV chamber size.\par \par  [FreeTextEntry2] : dyspnea on exertion, multiple CAD risk factors, HTN, dyslipidemia, obesity, type 2 diabetes

## 2023-06-14 NOTE — PHYSICAL EXAM
[Well Developed] : well developed [Well Nourished] : well nourished [No Acute Distress] : no acute distress [Normal Venous Pressure] : normal venous pressure [No Carotid Bruit] : no carotid bruit [Normal S1, S2] : normal S1, S2 [No Murmur] : no murmur [No Rub] : no rub [No Gallop] : no gallop [Clear Lung Fields] : clear lung fields [Good Air Entry] : good air entry [No Respiratory Distress] : no respiratory distress  [Soft] : abdomen soft [Non Tender] : non-tender [No Masses/organomegaly] : no masses/organomegaly [Normal Bowel Sounds] : normal bowel sounds [No Edema] : no edema [No Cyanosis] : no cyanosis [No Clubbing] : no clubbing [No Varicosities] : no varicosities [No Rash] : no rash [No Skin Lesions] : no skin lesions [Moves all extremities] : moves all extremities [No Focal Deficits] : no focal deficits [Normal Speech] : normal speech [Alert and Oriented] : alert and oriented [Normal memory] : normal memory [General Appearance - Well Developed] : well developed [Normal Appearance] : normal appearance [Well Groomed] : well groomed [General Appearance - Well Nourished] : well nourished [General Appearance - In No Acute Distress] : no acute distress [No Deformities] : no deformities [Normal Conjunctiva] : the conjunctiva exhibited no abnormalities [Eyelids - No Xanthelasma] : the eyelids demonstrated no xanthelasmas [Normal Oral Mucosa] : normal oral mucosa [No Oral Pallor] : no oral pallor [No Oral Cyanosis] : no oral cyanosis [Normal Jugular Venous A Waves Present] : normal jugular venous A waves present [Normal Jugular Venous V Waves Present] : normal jugular venous V waves present [No Jugular Venous Howard A Waves] : no jugular venous howard A waves [Respiration, Rhythm And Depth] : normal respiratory rhythm and effort [Exaggerated Use Of Accessory Muscles For Inspiration] : no accessory muscle use [Auscultation Breath Sounds / Voice Sounds] : lungs were clear to auscultation bilaterally [Heart Rate And Rhythm] : heart rate and rhythm were normal [Heart Sounds] : normal S1 and S2 [Murmurs] : no murmurs present [Abdomen Soft] : soft [Abdomen Tenderness] : non-tender [Abdomen Mass (___ Cm)] : no abdominal mass palpated [Abnormal Walk] : normal gait [Gait - Sufficient For Exercise Testing] : the gait was sufficient for exercise testing [Nail Clubbing] : no clubbing of the fingernails [Cyanosis, Localized] : no localized cyanosis [Petechial Hemorrhages (___cm)] : no petechial hemorrhages [Skin Color & Pigmentation] : normal skin color and pigmentation [] : no rash [No Venous Stasis] : no venous stasis [Skin Lesions] : no skin lesions [No Skin Ulcers] : no skin ulcer [No Xanthoma] : no  xanthoma was observed [Oriented To Time, Place, And Person] : oriented to person, place, and time [Affect] : the affect was normal [Mood] : the mood was normal [No Anxiety] : not feeling anxious [de-identified] : Limited ambulation walking less than 5 minutes [FreeTextEntry1] : Pleasant obese elderly male

## 2023-09-06 ENCOUNTER — APPOINTMENT (OUTPATIENT)
Dept: ORTHOPEDIC SURGERY | Facility: CLINIC | Age: 78
End: 2023-09-06
Payer: MEDICARE

## 2023-09-06 PROCEDURE — 73562 X-RAY EXAM OF KNEE 3: CPT | Mod: RT

## 2023-09-06 PROCEDURE — 20611 DRAIN/INJ JOINT/BURSA W/US: CPT | Mod: RT

## 2023-09-06 PROCEDURE — 99214 OFFICE O/P EST MOD 30 MIN: CPT | Mod: 25

## 2023-09-06 NOTE — PHYSICAL EXAM
[5___] : hamstring 5[unfilled]/5 [Right] : right knee [Lateral] : lateral [[place]] : skyline [AP Standing] : anteroposterior standing [advanced tricompartmental OA with medial compartment narrowing and varus alignment] : advanced tricompartmental OA with medial compartment narrowing and varus alignment [] : non-antalgic

## 2023-09-06 NOTE — PROCEDURE
[Right] : of the right [Knee] : knee [Pain] : pain [Inflammation] : inflammation [Repeat series performed] : repeat series performed [Alcohol] : alcohol [Betadine] : betadine [Ethyl Chloride sprayed topically] : ethyl chloride sprayed topically [Durolane (60mg)] : 60mg of Durolane [] : Patient tolerated procedure well [Call if redness, pain or fever occur] : call if redness, pain or fever occur [Apply ice for 15min out of every hour for the next 12-24 hours as tolerated] : apply ice for 15 minutes out of every hour for the next 12-24 hours as tolerated [Patient was advised to rest the joint(s) for ____ days] : patient was advised to rest the joint(s) for [unfilled] days [Previous OTC use and PT nontherapeutic] : patient has tried OTC's including aspirin, Ibuprofen, Aleve, etc or prescription NSAIDS, and/or exercises at home and/or physical therapy without satisfactory response [Patient had decreased mobility in the joint] : patient had decreased mobility in the joint [Risks, benefits, alternatives discussed / Verbal consent obtained] : the risks benefits, and alternatives have been discussed, and verbal consent was obtained [All ultrasound images have been permanently captured and stored accordingly in our picture archiving and communication system] : All ultrasound images have been permanently captured and stored accordingly in our picture archiving and communication system [Visualization of the needle and placement of injection was performed without complication] : visualization of the needle and placement of injection was performed without complication [de-identified] : needle placement

## 2023-09-06 NOTE — HISTORY OF PRESENT ILLNESS
[de-identified] : Patient reports improvement with the previous course of visco injection in July 2022

## 2023-11-14 ENCOUNTER — APPOINTMENT (OUTPATIENT)
Dept: CARDIOLOGY | Facility: CLINIC | Age: 78
End: 2023-11-14
Payer: MEDICARE

## 2023-11-14 VITALS
DIASTOLIC BLOOD PRESSURE: 60 MMHG | HEIGHT: 74 IN | WEIGHT: 250 LBS | HEART RATE: 70 BPM | SYSTOLIC BLOOD PRESSURE: 100 MMHG | OXYGEN SATURATION: 97 % | BODY MASS INDEX: 32.08 KG/M2

## 2023-11-14 DIAGNOSIS — Z01.810 ENCOUNTER FOR PREPROCEDURAL CARDIOVASCULAR EXAMINATION: ICD-10-CM

## 2023-11-14 PROCEDURE — 99215 OFFICE O/P EST HI 40 MIN: CPT

## 2023-11-14 RX ORDER — HYDROCHLOROTHIAZIDE 25 MG/1
25 TABLET ORAL DAILY
Refills: 0 | Status: DISCONTINUED | COMMUNITY
End: 2023-11-14

## 2023-11-14 RX ORDER — LOSARTAN POTASSIUM 100 MG/1
100 TABLET, FILM COATED ORAL DAILY
Qty: 90 | Refills: 1 | Status: DISCONTINUED | COMMUNITY
Start: 2019-01-15 | End: 2023-11-14

## 2024-01-03 NOTE — REASON FOR VISIT
[Other: ____] : [unfilled] [FreeTextEntry1] : Matt is a 78 year-old male with a history of hypertension, asthma, chronic back pain, remote tobacco, and moderate obesity, moderate alcohol, AF on Xarelto.   Patient has dyspnea with moderate exertion possible anginal equivalent. Cardiovascular review of symptoms is negative for exertional chest pain,  palpitations, dizziness or syncope.  No PND or orthopnea leg edema.  No bleeding or black stool.  Patient has chronic back pain, pain management with epidural injections with Dr. Mims.  Patient is unable to complete treadmill stress test and will be scheduled for pharmacologic stress test.   Patient continues to drink 2 vodkas per day. Advised patient to stop all forms of alcohol.   Patient had a prior admission to [facility] on July 25, 2014, for Syncope found to be in rapid atrial fibrillation. Patient states he had several drinks the night prior, the next morning after drinking coffee had a syncopal event. He was found to be in rapid atrial fibrillation in the emergency room, ventricular rate of 140 and with rate control converted to sinus rhythm. The NYA-VASC score 2,  on Xarelto  20mg daily.   Cardiogram February 2019 LVEF 60%, mild diastolic dysfunction, mild MR and TR, normal RVSP  Lexascan Myoview stress test February 2019, LVEF 60%, small fixed apical defect with normal wall motion likely attenuation artifact, no ischemia, no chest pain, baseline sinus bradycardia PRWP, old ASWMI, NSST  Echocardiogram February 2018, LVEF 60%, mild diastolic dysfunction, mild MR and TR, normal RVSP  EKG January 2019, sinus rhythm first degree AV block PRWP, old anteroseptal MI  Exercise Myoview stress test December 2016, LVEF 59%, normal perfusion nonischemic EKG response, symptoms, 89% MPHR, 5 minute Roberto protocol.  2-D echo November 2016, LVEF 65%, LA size is 4.6, mild diastolic dysfunction, mild MR, mild/moderate PI, PASP 33  EKG Nov 2015, SB VR47 PRWP LAD  EKG October 2014 SB PRWP LAD  CardioNet event recorder completed for 4 weeks Sept-Oct 2014, NSR no AFib PVC's NSVT.   2DEcho SH[facility] July, 2014, reveals LVEF of 60% to 65%, mild diastolic dysfunction, mild MR, moderate TR, PA pressure of 43, moderate left atrial enlargement at 5.1, mild left ventricular hypertrophy, and normal LV chamber size.   [Follow-Up - Clinic] : a clinic follow-up of [FreeTextEntry2] : dyspnea on exertion, multiple CAD risk factors, HTN, dyslipidemia, obesity, type 2 diabetes

## 2024-01-03 NOTE — DISCUSSION/SUMMARY
[FreeTextEntry1] : Matt is a 78-year-old male  with medical history detailed above and active medical issues including:  - Cardiology preop endoscopy Dr. Mando Gambino [place] office procedure 12/11/2023.  Recent nausea vomiting, anorexia.  Patient is optimized from a cardiovascular perspective and may proceed with surgery.  Patient will hold Xarelto 48 hours prior to procedure..  Please call with any further questions.  - Dyspnea on exertion, infarct without ischemia seen on Myoview stress test Oct 2021  - PAF in the setting of dehydration alcohol and caffeine, CHADS-VASc score 2 on Xarelto  - HTN low BPs discontinue HCTZ, reduce losartan to 25 Mg daily, continue atenolol 50 Mg twice daily with follow-up home BPs.  - Asthma  - Obesity. Discussed calorie reduction and increased exercise as a weight reduction strategy.  - Brief NSVT in setting of normal LVEF  - History of hemorrhoidal surgery stable  Continued risk factor modification as been discussed, low sodium low fat diet, moderate physical activity.  Patient will be seen in cardiology follow-up months after noninvasive testing.    Current cardiac medications remain unchanged. Repeat labs will be ordered with PMD. Recommended increased oral hydration with electrolyte suppliment drinks, avoid caffeine and alcohol intake.  Matt will follow-up with Dr. Turner for primary care.  Total time spent 45 minutes, reviewing of test results, chart information, patient discussion, physical exam and completion of chart documentation.

## 2024-01-03 NOTE — PHYSICAL EXAM
[Well Developed] : well developed [Well Nourished] : well nourished [No Acute Distress] : no acute distress [Normal Venous Pressure] : normal venous pressure [No Carotid Bruit] : no carotid bruit [Normal S1, S2] : normal S1, S2 [No Murmur] : no murmur [No Rub] : no rub [No Gallop] : no gallop [Clear Lung Fields] : clear lung fields [Good Air Entry] : good air entry [No Respiratory Distress] : no respiratory distress  [Soft] : abdomen soft [Non Tender] : non-tender [No Masses/organomegaly] : no masses/organomegaly [Normal Bowel Sounds] : normal bowel sounds [No Edema] : no edema [No Cyanosis] : no cyanosis [No Clubbing] : no clubbing [No Varicosities] : no varicosities [No Rash] : no rash [No Skin Lesions] : no skin lesions [Moves all extremities] : moves all extremities [No Focal Deficits] : no focal deficits [Normal Speech] : normal speech [Alert and Oriented] : alert and oriented [Normal memory] : normal memory [de-identified] : Limited ambulation walking less than 5 minutes [General Appearance - Well Developed] : well developed [Normal Appearance] : normal appearance [Well Groomed] : well groomed [General Appearance - Well Nourished] : well nourished [No Deformities] : no deformities [General Appearance - In No Acute Distress] : no acute distress [FreeTextEntry1] : Pleasant obese elderly male [Normal Conjunctiva] : the conjunctiva exhibited no abnormalities [Eyelids - No Xanthelasma] : the eyelids demonstrated no xanthelasmas [Normal Oral Mucosa] : normal oral mucosa [No Oral Pallor] : no oral pallor [No Oral Cyanosis] : no oral cyanosis [Normal Jugular Venous A Waves Present] : normal jugular venous A waves present [Normal Jugular Venous V Waves Present] : normal jugular venous V waves present [No Jugular Venous Howard A Waves] : no jugular venous howard A waves [Respiration, Rhythm And Depth] : normal respiratory rhythm and effort [Exaggerated Use Of Accessory Muscles For Inspiration] : no accessory muscle use [Auscultation Breath Sounds / Voice Sounds] : lungs were clear to auscultation bilaterally [Heart Rate And Rhythm] : heart rate and rhythm were normal [Heart Sounds] : normal S1 and S2 [Murmurs] : no murmurs present [Abdomen Tenderness] : non-tender [Abdomen Soft] : soft [Abdomen Mass (___ Cm)] : no abdominal mass palpated [Abnormal Walk] : normal gait [Gait - Sufficient For Exercise Testing] : the gait was sufficient for exercise testing [Nail Clubbing] : no clubbing of the fingernails [Cyanosis, Localized] : no localized cyanosis [Petechial Hemorrhages (___cm)] : no petechial hemorrhages [Skin Color & Pigmentation] : normal skin color and pigmentation [] : no rash [No Venous Stasis] : no venous stasis [Skin Lesions] : no skin lesions [No Skin Ulcers] : no skin ulcer [No Xanthoma] : no  xanthoma was observed [Oriented To Time, Place, And Person] : oriented to person, place, and time [Affect] : the affect was normal [No Anxiety] : not feeling anxious [Mood] : the mood was normal

## 2024-01-10 ENCOUNTER — APPOINTMENT (OUTPATIENT)
Dept: CARDIOLOGY | Facility: CLINIC | Age: 79
End: 2024-01-10

## 2024-01-17 PROBLEM — J45.909 ASTHMA: Status: ACTIVE | Noted: 2018-01-03

## 2024-01-17 PROBLEM — K21.9 GERD (GASTROESOPHAGEAL REFLUX DISEASE): Status: ACTIVE | Noted: 2018-01-03

## 2024-01-17 PROBLEM — M54.9 BACK PAIN: Status: ACTIVE | Noted: 2018-01-03

## 2024-01-24 ENCOUNTER — APPOINTMENT (OUTPATIENT)
Dept: CARDIOLOGY | Facility: CLINIC | Age: 79
End: 2024-01-24
Payer: MEDICARE

## 2024-01-24 VITALS — DIASTOLIC BLOOD PRESSURE: 50 MMHG | SYSTOLIC BLOOD PRESSURE: 92 MMHG | OXYGEN SATURATION: 93 % | HEART RATE: 75 BPM

## 2024-01-24 DIAGNOSIS — R06.02 SHORTNESS OF BREATH: ICD-10-CM

## 2024-01-24 DIAGNOSIS — M54.9 DORSALGIA, UNSPECIFIED: ICD-10-CM

## 2024-01-24 DIAGNOSIS — J45.909 UNSPECIFIED ASTHMA, UNCOMPLICATED: ICD-10-CM

## 2024-01-24 DIAGNOSIS — K21.9 GASTRO-ESOPHAGEAL REFLUX DISEASE W/OUT ESOPHAGITIS: ICD-10-CM

## 2024-01-24 DIAGNOSIS — I71.20 THORACIC AORTIC ANEURYSM, WITHOUT RUPTURE, UNSPECIFIED: ICD-10-CM

## 2024-01-24 PROCEDURE — 99215 OFFICE O/P EST HI 40 MIN: CPT

## 2024-01-24 PROCEDURE — 93306 TTE W/DOPPLER COMPLETE: CPT

## 2024-01-24 PROCEDURE — 93926 LOWER EXTREMITY STUDY: CPT

## 2024-01-24 PROCEDURE — 93880 EXTRACRANIAL BILAT STUDY: CPT

## 2024-01-24 RX ORDER — LOSARTAN POTASSIUM 25 MG/1
25 TABLET, FILM COATED ORAL DAILY
Qty: 90 | Refills: 1 | Status: DISCONTINUED | COMMUNITY
Start: 1900-01-01 | End: 2024-01-24

## 2024-01-24 RX ORDER — OMEGA-3/DHA/EPA/FISH OIL 300-1000MG
CAPSULE ORAL
Refills: 0 | Status: ACTIVE | COMMUNITY

## 2024-01-24 NOTE — REASON FOR VISIT
[Other: ____] : [unfilled] [Follow-Up - Clinic] : a clinic follow-up of [FreeTextEntry1] : Matt is a 78-year-old male with a history of hypertension, asthma, chronic back pain, remote tobacco, and moderate obesity, moderate alcohol, AF on Xarelto.   Patient has dyspnea with moderate exertion possible anginal equivalent. Cardiovascular review of symptoms is negative for exertional chest pain,  palpitations, dizziness or syncope.  No PND or orthopnea leg edema.  No bleeding or black stool.  Patient has chronic back pain, pain management with epidural injections with Dr. Mims.  Patient is unable to complete treadmill stress test and will be scheduled for pharmacologic stress test.   Patient continues to drink 2 vodkas per day. Advised patient to stop all forms of alcohol.   Patient had a prior admission to [facility] on July 25, 2014, for Syncope found to be in rapid atrial fibrillation. Patient states he had several drinks the night prior, the next morning after drinking coffee had a syncopal event. He was found to be in rapid atrial fibrillation in the emergency room, ventricular rate of 140 and with rate control converted to sinus rhythm. The NYA-VASC score 2,  on Xarelto  20mg daily.   Echocardiogram Jan 2024 LVEF 65%, moderate LVH, mild MR and TR, ascending aorta 4.8 cm increased  Echocardiogram February 2019 LVEF 60%, mild diastolic dysfunction, mild MR and TR, normal RVSP  Lexascan Myoview stress test February 2019, LVEF 60%, small fixed apical defect with normal wall motion likely attenuation artifact, no ischemia, no chest pain, baseline sinus bradycardia PRWP, old ASWMI, NSST  Echocardiogram February 2018, LVEF 60%, mild diastolic dysfunction, mild MR and TR, normal RVSP  EKG January 2019, sinus rhythm first degree AV block PRWP, old anteroseptal MI  Exercise Myoview stress test December 2016, LVEF 59%, normal perfusion nonischemic EKG response, symptoms, 89% MPHR, 5 minute Roberto protocol.  2-D echo November 2016, LVEF 65%, LA size is 4.6, mild diastolic dysfunction, mild MR, mild/moderate PI, PASP 33  EKG Nov 2015, SB VR47 PRWP LAD  EKG October 2014 SB PRWP LAD  CardioNet event recorder completed for 4 weeks Sept-Oct 2014, NSR no AFib PVC's NSVT.   2DEcho SHH July, 2014, reveals LVEF of 60% to 65%, mild diastolic dysfunction, mild MR, moderate TR, PA pressure of 43, moderate left atrial enlargement at 5.1, mild left ventricular hypertrophy, and normal LV chamber size.   [FreeTextEntry2] : dyspnea on exertion, multiple CAD risk factors, HTN, dyslipidemia, obesity, type 2 diabetes

## 2024-01-24 NOTE — PHYSICAL EXAM
[Well Developed] : well developed [Well Nourished] : well nourished [No Acute Distress] : no acute distress [Normal Venous Pressure] : normal venous pressure [No Carotid Bruit] : no carotid bruit [Normal S1, S2] : normal S1, S2 [No Murmur] : no murmur [No Rub] : no rub [No Gallop] : no gallop [Clear Lung Fields] : clear lung fields [Good Air Entry] : good air entry [No Respiratory Distress] : no respiratory distress  [Soft] : abdomen soft [Non Tender] : non-tender [No Masses/organomegaly] : no masses/organomegaly [Normal Bowel Sounds] : normal bowel sounds [No Edema] : no edema [No Cyanosis] : no cyanosis [No Clubbing] : no clubbing [No Varicosities] : no varicosities [No Rash] : no rash [No Skin Lesions] : no skin lesions [Moves all extremities] : moves all extremities [No Focal Deficits] : no focal deficits [Normal Speech] : normal speech [Alert and Oriented] : alert and oriented [Normal memory] : normal memory [General Appearance - Well Developed] : well developed [Normal Appearance] : normal appearance [Well Groomed] : well groomed [General Appearance - Well Nourished] : well nourished [No Deformities] : no deformities [General Appearance - In No Acute Distress] : no acute distress [Normal Conjunctiva] : the conjunctiva exhibited no abnormalities [Eyelids - No Xanthelasma] : the eyelids demonstrated no xanthelasmas [Normal Oral Mucosa] : normal oral mucosa [No Oral Pallor] : no oral pallor [No Oral Cyanosis] : no oral cyanosis [Normal Jugular Venous A Waves Present] : normal jugular venous A waves present [Normal Jugular Venous V Waves Present] : normal jugular venous V waves present [No Jugular Venous Howard A Waves] : no jugular venous howard A waves [Respiration, Rhythm And Depth] : normal respiratory rhythm and effort [Exaggerated Use Of Accessory Muscles For Inspiration] : no accessory muscle use [Auscultation Breath Sounds / Voice Sounds] : lungs were clear to auscultation bilaterally [Heart Rate And Rhythm] : heart rate and rhythm were normal [Heart Sounds] : normal S1 and S2 [Murmurs] : no murmurs present [Abdomen Soft] : soft [Abdomen Tenderness] : non-tender [Abdomen Mass (___ Cm)] : no abdominal mass palpated [Abnormal Walk] : normal gait [Gait - Sufficient For Exercise Testing] : the gait was sufficient for exercise testing [Nail Clubbing] : no clubbing of the fingernails [Cyanosis, Localized] : no localized cyanosis [Petechial Hemorrhages (___cm)] : no petechial hemorrhages [Skin Color & Pigmentation] : normal skin color and pigmentation [] : no rash [No Venous Stasis] : no venous stasis [Skin Lesions] : no skin lesions [No Skin Ulcers] : no skin ulcer [No Xanthoma] : no  xanthoma was observed [Oriented To Time, Place, And Person] : oriented to person, place, and time [Affect] : the affect was normal [Mood] : the mood was normal [No Anxiety] : not feeling anxious [de-identified] : Limited ambulation walking less than 5 minutes [FreeTextEntry1] : Pleasant obese elderly male

## 2024-01-24 NOTE — DISCUSSION/SUMMARY
[FreeTextEntry1] : Matt is a 78-year-old male  with medical history detailed above and active medical issues including:  - Echocardiogram Jan 2024 LVEF 65%, moderate LVH, mild MR and TR, ascending aorta 4.8 cm increased.  CTA chest ordered.   - Dyspnea on exertion, infarct without ischemia seen on Myoview stress test Oct 2021  - PAF in the setting of dehydration alcohol and caffeine, CHADS-VASc score 2 on Xarelto  - HTN low BPs discontinue losartan, continue atenolol 50 Mg twice daily with follow-up home BPs.  - Asthma  - Obesity. Discussed calorie reduction and increased exercise as a weight reduction strategy.  - Brief NSVT in setting of normal LVEF  - History of hemorrhoidal surgery stable  Continued risk factor modification as been discussed, low sodium low fat diet, moderate physical activity.  Patient will be seen in cardiology follow-up after noninvasive testing.    Current cardiac medications remain unchanged. Repeat labs will be ordered with PMD. Recommended increased oral hydration with electrolyte suppliment drinks, avoid caffeine and alcohol intake.  Matt will follow-up with Dr. Turner for primary care.  Total time spent 45 minutes, reviewing of test results, chart information, patient discussion, physical exam and completion of chart documentation.

## 2024-02-03 DIAGNOSIS — R55 SYNCOPE AND COLLAPSE: ICD-10-CM

## 2024-02-03 DIAGNOSIS — I47.29 OTHER VENTRICULAR TACHYCARDIA: ICD-10-CM

## 2024-04-03 ENCOUNTER — RX RENEWAL (OUTPATIENT)
Age: 79
End: 2024-04-03

## 2024-04-03 DIAGNOSIS — I10 ESSENTIAL (PRIMARY) HYPERTENSION: ICD-10-CM

## 2024-04-03 DIAGNOSIS — I48.91 UNSPECIFIED ATRIAL FIBRILLATION: ICD-10-CM

## 2024-04-03 RX ORDER — RIVAROXABAN 15 MG/1
15 TABLET, FILM COATED ORAL
Qty: 90 | Refills: 3 | Status: ACTIVE | COMMUNITY
Start: 2018-10-29 | End: 1900-01-01

## 2024-04-11 ENCOUNTER — APPOINTMENT (OUTPATIENT)
Dept: ORTHOPEDIC SURGERY | Facility: CLINIC | Age: 79
End: 2024-04-11
Payer: MEDICARE

## 2024-04-11 DIAGNOSIS — M17.11 UNILATERAL PRIMARY OSTEOARTHRITIS, RIGHT KNEE: ICD-10-CM

## 2024-04-11 PROCEDURE — 20611 DRAIN/INJ JOINT/BURSA W/US: CPT | Mod: RT

## 2024-04-11 NOTE — HISTORY OF PRESENT ILLNESS
[de-identified] : Since his last injection in September, patient reports that the pain returned about 3 weeks later. He is now c/o RT sided thigh pain.

## 2024-04-11 NOTE — DISCUSSION/SUMMARY
[de-identified] : We discussed subsequent course of viscosupplementation, continued formal PT, a home exercise program, ice therapy and the role of NSAIDS for the pain. These modalities will improve the patient's functionality in their activities of daily life.  Risks, benefits and contraindications were discussed.  The patient will follow up in 6 weeks or sooner as needed.

## 2024-04-11 NOTE — PHYSICAL EXAM
[5___] : hamstring 5[unfilled]/5 [Right] : right knee [Lateral] : lateral [[place]] : skyline [AP Standing] : anteroposterior standing [advanced tricompartmental OA with medial compartment narrowing and varus alignment] : advanced tricompartmental OA with medial compartment narrowing and varus alignment [] : non-antalgic

## 2024-04-11 NOTE — PROCEDURE
[Large Joint Injection] : Large joint injection [Right] : of the right [Knee] : knee [Pain] : pain [Inflammation] : inflammation [X-ray evidence of Osteoarthritis on this or prior visit] : x-ray evidence of Osteoarthritis on this or prior visit [Alcohol] : alcohol [Betadine] : betadine [Ethyl Chloride sprayed topically] : ethyl chloride sprayed topically [Durolane (60mg)] : 60mg of Durolane [#1] : series #1 [] : Patient tolerated procedure well [Call if redness, pain or fever occur] : call if redness, pain or fever occur [Apply ice for 15min out of every hour for the next 12-24 hours as tolerated] : apply ice for 15 minutes out of every hour for the next 12-24 hours as tolerated [Patient was advised to rest the joint(s) for ____ days] : patient was advised to rest the joint(s) for [unfilled] days [Previous OTC use and PT nontherapeutic] : patient has tried OTC's including aspirin, Ibuprofen, Aleve, etc or prescription NSAIDS, and/or exercises at home and/or physical therapy without satisfactory response [Patient had decreased mobility in the joint] : patient had decreased mobility in the joint [Risks, benefits, alternatives discussed / Verbal consent obtained] : the risks benefits, and alternatives have been discussed, and verbal consent was obtained [All ultrasound images have been permanently captured and stored accordingly in our picture archiving and communication system] : All ultrasound images have been permanently captured and stored accordingly in our picture archiving and communication system [Visualization of the needle and placement of injection was performed without complication] : visualization of the needle and placement of injection was performed without complication

## 2024-09-25 ENCOUNTER — APPOINTMENT (OUTPATIENT)
Dept: CARDIOLOGY | Facility: CLINIC | Age: 79
End: 2024-09-25
Payer: MEDICARE

## 2024-09-25 VITALS
HEART RATE: 64 BPM | BODY MASS INDEX: 29.52 KG/M2 | WEIGHT: 230 LBS | SYSTOLIC BLOOD PRESSURE: 94 MMHG | HEIGHT: 74 IN | DIASTOLIC BLOOD PRESSURE: 60 MMHG | OXYGEN SATURATION: 94 %

## 2024-09-25 DIAGNOSIS — Z01.810 ENCOUNTER FOR PREPROCEDURAL CARDIOVASCULAR EXAMINATION: ICD-10-CM

## 2024-09-25 DIAGNOSIS — I48.91 UNSPECIFIED ATRIAL FIBRILLATION: ICD-10-CM

## 2024-09-25 DIAGNOSIS — I71.20 THORACIC AORTIC ANEURYSM, WITHOUT RUPTURE, UNSPECIFIED: ICD-10-CM

## 2024-09-25 DIAGNOSIS — K21.9 GASTRO-ESOPHAGEAL REFLUX DISEASE W/OUT ESOPHAGITIS: ICD-10-CM

## 2024-09-25 DIAGNOSIS — I47.29 OTHER VENTRICULAR TACHYCARDIA: ICD-10-CM

## 2024-09-25 DIAGNOSIS — J45.909 UNSPECIFIED ASTHMA, UNCOMPLICATED: ICD-10-CM

## 2024-09-25 DIAGNOSIS — I10 ESSENTIAL (PRIMARY) HYPERTENSION: ICD-10-CM

## 2024-09-25 DIAGNOSIS — R55 SYNCOPE AND COLLAPSE: ICD-10-CM

## 2024-09-25 PROCEDURE — 99215 OFFICE O/P EST HI 40 MIN: CPT

## 2024-09-25 PROCEDURE — G2211 COMPLEX E/M VISIT ADD ON: CPT

## 2024-09-25 NOTE — PHYSICAL EXAM
[Well Developed] : well developed [Well Nourished] : well nourished [No Acute Distress] : no acute distress [Normal Venous Pressure] : normal venous pressure [No Carotid Bruit] : no carotid bruit [Normal S1, S2] : normal S1, S2 [No Murmur] : no murmur [No Rub] : no rub [No Gallop] : no gallop [Clear Lung Fields] : clear lung fields [Good Air Entry] : good air entry [No Respiratory Distress] : no respiratory distress  [Soft] : abdomen soft [Non Tender] : non-tender [No Masses/organomegaly] : no masses/organomegaly [Normal Bowel Sounds] : normal bowel sounds [No Edema] : no edema [No Cyanosis] : no cyanosis [No Clubbing] : no clubbing [No Varicosities] : no varicosities [No Rash] : no rash [No Skin Lesions] : no skin lesions [Moves all extremities] : moves all extremities [No Focal Deficits] : no focal deficits [Normal Speech] : normal speech [Alert and Oriented] : alert and oriented [Normal memory] : normal memory [General Appearance - Well Developed] : well developed [Normal Appearance] : normal appearance [Well Groomed] : well groomed [General Appearance - Well Nourished] : well nourished [No Deformities] : no deformities [General Appearance - In No Acute Distress] : no acute distress [Normal Conjunctiva] : the conjunctiva exhibited no abnormalities [Eyelids - No Xanthelasma] : the eyelids demonstrated no xanthelasmas [Normal Oral Mucosa] : normal oral mucosa [No Oral Pallor] : no oral pallor [No Oral Cyanosis] : no oral cyanosis [Normal Jugular Venous A Waves Present] : normal jugular venous A waves present [Normal Jugular Venous V Waves Present] : normal jugular venous V waves present [No Jugular Venous Howard A Waves] : no jugular venous howard A waves [Respiration, Rhythm And Depth] : normal respiratory rhythm and effort [Exaggerated Use Of Accessory Muscles For Inspiration] : no accessory muscle use [Auscultation Breath Sounds / Voice Sounds] : lungs were clear to auscultation bilaterally [Heart Rate And Rhythm] : heart rate and rhythm were normal [Heart Sounds] : normal S1 and S2 [Murmurs] : no murmurs present [Abdomen Soft] : soft [Abdomen Tenderness] : non-tender [Abdomen Mass (___ Cm)] : no abdominal mass palpated [Abnormal Walk] : normal gait [Gait - Sufficient For Exercise Testing] : the gait was sufficient for exercise testing [Nail Clubbing] : no clubbing of the fingernails [Cyanosis, Localized] : no localized cyanosis [Petechial Hemorrhages (___cm)] : no petechial hemorrhages [Skin Color & Pigmentation] : normal skin color and pigmentation [] : no rash [No Venous Stasis] : no venous stasis [Skin Lesions] : no skin lesions [No Skin Ulcers] : no skin ulcer [No Xanthoma] : no  xanthoma was observed [Oriented To Time, Place, And Person] : oriented to person, place, and time [Affect] : the affect was normal [Mood] : the mood was normal [No Anxiety] : not feeling anxious [de-identified] : Limited ambulation walking less than 5 minutes [FreeTextEntry1] : Pleasant obese elderly male

## 2024-09-25 NOTE — DISCUSSION/SUMMARY
[FreeTextEntry1] : Patient has medical history detailed above and active medical issues including:  - Cardiology clearance for colonoscopy 9/30/2024 Dr Tim Morrell fax [phone].  Patient is optimized from a cardiovascular perspective and may proceed with colonoscopy.  Xarelto may be held 2 days prior to procedure and restarted after procedure.  Please call with any further questions.  - Echocardiogram Jan 2024 LVEF 65%, moderate LVH, mild MR and TR, ascending aorta 4.8 cm increased.  CTA chest ordered.   - Dyspnea on exertion, infarct without ischemia seen on Myoview stress test Oct 2021  - PAF in the setting of dehydration alcohol and caffeine, CHADS-VASc score 2 on Xarelto  - HTN low BPs discontinue losartan, continue atenolol 50 Mg twice daily with follow-up home BPs.  - Asthma  - Obesity. Discussed calorie reduction and increased exercise as a weight reduction strategy.  - Brief NSVT in setting of normal LVEF  - History of hemorrhoidal surgery stable  Continued risk factor modification as been discussed, low sodium low fat diet, moderate physical activity.  Patient will be seen in cardiology follow-up 6 months.  Patient will be moving to [place] to be with his son and family.    Current cardiac medications remain unchanged. Repeat labs will be ordered with PMD. Recommended increased oral hydration with electrolyte suppliment drinks, avoid caffeine and alcohol intake.  Matt will follow-up with Dr. Turner for primary care.  Total time spent 45 minutes, reviewing of test results, chart information, patient discussion, physical exam and completion of chart documentation.

## 2024-09-25 NOTE — DISCUSSION/SUMMARY
[FreeTextEntry1] : Patient has medical history detailed above and active medical issues including:  - Cardiology clearance for colonoscopy 9/30/2024 Dr Tim Morrell fax [phone].  Patient is optimized from a cardiovascular perspective and may proceed with colonoscopy.  Xarelto may be held 2 days prior to procedure and restarted after procedure.  Please call with any further questions.  - Echocardiogram Jan 2024 LVEF 65%, moderate LVH, mild MR and TR, ascending aorta 4.8 cm increased.  CTA chest ordered.   - Dyspnea on exertion, infarct without ischemia seen on Myoview stress test Oct 2021  - PAF in the setting of dehydration alcohol and caffeine, CHADS-VASc score 2 on Xarelto  - HTN low BPs discontinue losartan, continue atenolol 50 Mg twice daily with follow-up home BPs.  - Asthma  - Obesity. Discussed calorie reduction and increased exercise as a weight reduction strategy.  - Brief NSVT in setting of normal LVEF  - History of hemorrhoidal surgery stable  Continued risk factor modification as been discussed, low sodium low fat diet, moderate physical activity.  Patient will be seen in cardiology follow-up 6 months.  Patient will be moving to [place] to be with his son and family.    Current cardiac medications remain unchanged. Repeat labs will be ordered with PMD. Recommended increased oral hydration with electrolyte suppliment drinks, avoid caffeine and alcohol intake.  Matt will follow-up with Dr. Turner for primary care.  Total time spent 45 minutes, reviewing of test results, chart information, patient discussion, physical exam and completion of chart documentation.

## 2024-09-25 NOTE — REASON FOR VISIT
[Other: ____] : [unfilled] [Follow-Up - Clinic] : a clinic follow-up of [FreeTextEntry1] : Matt has a past medical history of hypertension, asthma, chronic back pain, remote tobacco, and moderate obesity, moderate alcohol, AF on Xarelto.   Patient has dyspnea with moderate exertion possible anginal equivalent. Cardiovascular review of symptoms is negative for exertional chest pain,  palpitations, dizziness or syncope.  No PND or orthopnea leg edema.  No bleeding or black stool.  Patient has chronic back pain, pain management with epidural injections with Dr. Mims.  Patient is unable to complete treadmill stress test and will be scheduled for pharmacologic stress test.   Patient continues to drink 2 vodkas per day. Advised patient to stop all forms of alcohol.   Patient had a prior admission to [facility] on July 25, 2014, for Syncope found to be in rapid atrial fibrillation. Patient states he had several drinks the night prior, the next morning after drinking coffee had a syncopal event. He was found to be in rapid atrial fibrillation in the emergency room, ventricular rate of 140 and with rate control converted to sinus rhythm. The NYA-VASC score 2,  on Xarelto  20mg daily.   Echocardiogram Jan 2024 LVEF 65%, moderate LVH, mild MR and TR, ascending aorta 4.8 cm increased  Echocardiogram February 2019 LVEF 60%, mild diastolic dysfunction, mild MR and TR, normal RVSP  Lexascan Myoview stress test February 2019, LVEF 60%, small fixed apical defect with normal wall motion likely attenuation artifact, no ischemia, no chest pain, baseline sinus bradycardia PRWP, old ASWMI, NSST  Echocardiogram February 2018, LVEF 60%, mild diastolic dysfunction, mild MR and TR, normal RVSP  EKG January 2019, sinus rhythm first degree AV block PRWP, old anteroseptal MI  Exercise Myoview stress test December 2016, LVEF 59%, normal perfusion nonischemic EKG response, symptoms, 89% MPHR, 5 minute Roberto protocol.  2-D echo November 2016, LVEF 65%, LA size is 4.6, mild diastolic dysfunction, mild MR, mild/moderate PI, PASP 33  EKG Nov 2015, SB VR47 PRWP LAD  EKG October 2014 SB PRWP LAD  Card[employer] event recorder completed for 4 weeks Sept-Oct 2014, NSR no AFib PVC's NSVT.   2DEcho [facility] July, 2014, reveals LVEF of 60% to 65%, mild diastolic dysfunction, mild MR, moderate TR, PA pressure of 43, moderate left atrial enlargement at 5.1, mild left ventricular hypertrophy, and normal LV chamber size.   [FreeTextEntry2] : dyspnea on exertion, multiple CAD risk factors, HTN, dyslipidemia, obesity, type 2 diabetes

## 2024-09-25 NOTE — PHYSICAL EXAM
[Well Developed] : well developed [Well Nourished] : well nourished [No Acute Distress] : no acute distress [Normal Venous Pressure] : normal venous pressure [No Carotid Bruit] : no carotid bruit [Normal S1, S2] : normal S1, S2 [No Murmur] : no murmur [No Rub] : no rub [No Gallop] : no gallop [Clear Lung Fields] : clear lung fields [Good Air Entry] : good air entry [No Respiratory Distress] : no respiratory distress  [Soft] : abdomen soft [Non Tender] : non-tender [No Masses/organomegaly] : no masses/organomegaly [Normal Bowel Sounds] : normal bowel sounds [No Edema] : no edema [No Cyanosis] : no cyanosis [No Clubbing] : no clubbing [No Varicosities] : no varicosities [No Rash] : no rash [No Skin Lesions] : no skin lesions [Moves all extremities] : moves all extremities [No Focal Deficits] : no focal deficits [Normal Speech] : normal speech [Alert and Oriented] : alert and oriented [Normal memory] : normal memory [General Appearance - Well Developed] : well developed [Normal Appearance] : normal appearance [Well Groomed] : well groomed [General Appearance - Well Nourished] : well nourished [No Deformities] : no deformities [General Appearance - In No Acute Distress] : no acute distress [Normal Conjunctiva] : the conjunctiva exhibited no abnormalities [Eyelids - No Xanthelasma] : the eyelids demonstrated no xanthelasmas [Normal Oral Mucosa] : normal oral mucosa [No Oral Pallor] : no oral pallor [No Oral Cyanosis] : no oral cyanosis [Normal Jugular Venous A Waves Present] : normal jugular venous A waves present [Normal Jugular Venous V Waves Present] : normal jugular venous V waves present [No Jugular Venous Howard A Waves] : no jugular venous howard A waves [Respiration, Rhythm And Depth] : normal respiratory rhythm and effort [Exaggerated Use Of Accessory Muscles For Inspiration] : no accessory muscle use [Auscultation Breath Sounds / Voice Sounds] : lungs were clear to auscultation bilaterally [Heart Rate And Rhythm] : heart rate and rhythm were normal [Heart Sounds] : normal S1 and S2 [Murmurs] : no murmurs present [Abdomen Soft] : soft [Abdomen Tenderness] : non-tender [Abdomen Mass (___ Cm)] : no abdominal mass palpated [Abnormal Walk] : normal gait [Gait - Sufficient For Exercise Testing] : the gait was sufficient for exercise testing [Nail Clubbing] : no clubbing of the fingernails [Cyanosis, Localized] : no localized cyanosis [Petechial Hemorrhages (___cm)] : no petechial hemorrhages [Skin Color & Pigmentation] : normal skin color and pigmentation [] : no rash [No Venous Stasis] : no venous stasis [Skin Lesions] : no skin lesions [No Skin Ulcers] : no skin ulcer [No Xanthoma] : no  xanthoma was observed [Oriented To Time, Place, And Person] : oriented to person, place, and time [Affect] : the affect was normal [Mood] : the mood was normal [No Anxiety] : not feeling anxious [de-identified] : Limited ambulation walking less than 5 minutes [FreeTextEntry1] : Pleasant obese elderly male

## 2024-09-25 NOTE — REASON FOR VISIT
[Other: ____] : [unfilled] [Follow-Up - Clinic] : a clinic follow-up of [FreeTextEntry1] : Matt has a past medical history of hypertension, asthma, chronic back pain, remote tobacco, and moderate obesity, moderate alcohol, AF on Xarelto.   Patient has dyspnea with moderate exertion possible anginal equivalent. Cardiovascular review of symptoms is negative for exertional chest pain,  palpitations, dizziness or syncope.  No PND or orthopnea leg edema.  No bleeding or black stool.  Patient has chronic back pain, pain management with epidural injections with Dr. Mims.  Patient is unable to complete treadmill stress test and will be scheduled for pharmacologic stress test.   Patient continues to drink 2 vodkas per day. Advised patient to stop all forms of alcohol.   Patient had a prior admission to [facility] on July 25, 2014, for Syncope found to be in rapid atrial fibrillation. Patient states he had several drinks the night prior, the next morning after drinking coffee had a syncopal event. He was found to be in rapid atrial fibrillation in the emergency room, ventricular rate of 140 and with rate control converted to sinus rhythm. The NYA-VASC score 2,  on Xarelto  20mg daily.   Echocardiogram Jan 2024 LVEF 65%, moderate LVH, mild MR and TR, ascending aorta 4.8 cm increased  Echocardiogram February 2019 LVEF 60%, mild diastolic dysfunction, mild MR and TR, normal RVSP  Lexascan Myoview stress test February 2019, LVEF 60%, small fixed apical defect with normal wall motion likely attenuation artifact, no ischemia, no chest pain, baseline sinus bradycardia PRWP, old ASWMI, NSST  Echocardiogram February 2018, LVEF 60%, mild diastolic dysfunction, mild MR and TR, normal RVSP  EKG January 2019, sinus rhythm first degree AV block PRWP, old anteroseptal MI  Exercise Myoview stress test December 2016, LVEF 59%, normal perfusion nonischemic EKG response, symptoms, 89% MPHR, 5 minute Roberto protocol.  2-D echo November 2016, LVEF 65%, LA size is 4.6, mild diastolic dysfunction, mild MR, mild/moderate PI, PASP 33  EKG Nov 2015, SB VR47 PRWP LAD  EKG October 2014 SB PRWP LAD  CardioNet event recorder completed for 4 weeks Sept-Oct 2014, NSR no AFib PVC's NSVT.   2DEcho [facility] July, 2014, reveals LVEF of 60% to 65%, mild diastolic dysfunction, mild MR, moderate TR, PA pressure of 43, moderate left atrial enlargement at 5.1, mild left ventricular hypertrophy, and normal LV chamber size.   [FreeTextEntry2] : dyspnea on exertion, multiple CAD risk factors, HTN, dyslipidemia, obesity, type 2 diabetes

## 2025-03-05 ENCOUNTER — NON-APPOINTMENT (OUTPATIENT)
Age: 80
End: 2025-03-05